# Patient Record
Sex: MALE | Race: WHITE | Employment: OTHER | URBAN - METROPOLITAN AREA
[De-identification: names, ages, dates, MRNs, and addresses within clinical notes are randomized per-mention and may not be internally consistent; named-entity substitution may affect disease eponyms.]

---

## 2017-01-24 ENCOUNTER — HOSPITAL ENCOUNTER (OUTPATIENT)
Dept: WOUND CARE | Age: 77
Discharge: HOME | End: 2017-01-24
Attending: PHYSICAL MEDICINE & REHABILITATION
Payer: MEDICARE

## 2017-01-24 PROCEDURE — 99214 OFFICE O/P EST MOD 30 MIN: CPT

## 2017-03-07 ENCOUNTER — HOSPITAL ENCOUNTER (OUTPATIENT)
Dept: WOUND CARE | Age: 77
Discharge: HOME | End: 2017-03-07
Attending: PHYSICAL MEDICINE & REHABILITATION
Payer: MEDICARE

## 2017-03-07 PROCEDURE — 99213 OFFICE O/P EST LOW 20 MIN: CPT

## 2017-03-07 PROCEDURE — 87088 URINE BACTERIA CULTURE: CPT | Performed by: GENERAL ACUTE CARE HOSPITAL

## 2017-03-07 PROCEDURE — 87186 SC STD MICRODIL/AGAR DIL: CPT | Performed by: GENERAL ACUTE CARE HOSPITAL

## 2017-03-07 PROCEDURE — 87086 URINE CULTURE/COLONY COUNT: CPT | Performed by: GENERAL ACUTE CARE HOSPITAL

## 2017-03-12 LAB
BACTERIA SPEC CULT: ABNORMAL
BACTERIA SPEC CULT: ABNORMAL
SERVICE CMNT-IMP: ABNORMAL

## 2017-04-04 ENCOUNTER — HOSPITAL ENCOUNTER (OUTPATIENT)
Dept: WOUND CARE | Age: 77
Discharge: HOME | End: 2017-04-04
Attending: PHYSICAL MEDICINE & REHABILITATION
Payer: MEDICARE

## 2017-04-04 PROCEDURE — 87075 CULTR BACTERIA EXCEPT BLOOD: CPT | Performed by: SURGERY

## 2017-04-04 PROCEDURE — 87076 CULTURE ANAEROBE IDENT EACH: CPT | Performed by: GENERAL ACUTE CARE HOSPITAL

## 2017-04-04 PROCEDURE — 87186 SC STD MICRODIL/AGAR DIL: CPT | Performed by: SURGERY

## 2017-04-04 PROCEDURE — 77030011256 HC DRSG MEPILEX <16IN NO BORD MOLN -A

## 2017-04-04 PROCEDURE — 87077 CULTURE AEROBIC IDENTIFY: CPT | Performed by: SURGERY

## 2017-04-04 PROCEDURE — 99215 OFFICE O/P EST HI 40 MIN: CPT

## 2017-04-04 PROCEDURE — 87205 SMEAR GRAM STAIN: CPT | Performed by: SURGERY

## 2017-04-10 LAB
BACTERIA SPEC CULT: ABNORMAL
GRAM STN SPEC: ABNORMAL
GRAM STN SPEC: ABNORMAL
SERVICE CMNT-IMP: ABNORMAL

## 2017-04-17 LAB
ANAEROBE ID RESULT 1, RAND1T: ABNORMAL
ANAEROBE ID W/SUSCEPT., ANIDLT: NORMAL
ANTIMICROBIAL SUSCEPT., RAND5T: ABNORMAL
BACTERIA SPEC CULT: ABNORMAL
SERVICE CMNT-IMP: ABNORMAL
SPECIMEN SOURCE: NORMAL

## 2017-04-26 ENCOUNTER — APPOINTMENT (OUTPATIENT)
Dept: CT IMAGING | Age: 77
End: 2017-04-26
Attending: EMERGENCY MEDICINE
Payer: MEDICARE

## 2017-04-26 ENCOUNTER — HOSPITAL ENCOUNTER (EMERGENCY)
Age: 77
Discharge: OTHER INSTITUTION | End: 2017-04-26
Attending: EMERGENCY MEDICINE
Payer: MEDICARE

## 2017-04-26 VITALS
OXYGEN SATURATION: 95 % | HEART RATE: 50 BPM | TEMPERATURE: 97.8 F | SYSTOLIC BLOOD PRESSURE: 108 MMHG | HEIGHT: 68 IN | DIASTOLIC BLOOD PRESSURE: 51 MMHG | RESPIRATION RATE: 16 BRPM | BODY MASS INDEX: 25.91 KG/M2 | WEIGHT: 171 LBS

## 2017-04-26 DIAGNOSIS — R10.9 ACUTE ABDOMINAL PAIN: Primary | ICD-10-CM

## 2017-04-26 DIAGNOSIS — K56.41 FECAL IMPACTION (HCC): ICD-10-CM

## 2017-04-26 DIAGNOSIS — K59.03 DRUG-INDUCED CONSTIPATION: ICD-10-CM

## 2017-04-26 LAB
ALBUMIN SERPL BCP-MCNC: 2.9 G/DL (ref 3.2–4.6)
ALBUMIN/GLOB SERPL: 0.6 {RATIO} (ref 1.2–3.5)
ALP SERPL-CCNC: 61 U/L (ref 50–136)
ALT SERPL-CCNC: 13 U/L (ref 12–65)
ANION GAP BLD CALC-SCNC: 11 MMOL/L (ref 7–16)
APPEARANCE UR: ABNORMAL
AST SERPL W P-5'-P-CCNC: 9 U/L (ref 15–37)
BACTERIA URNS QL MICRO: ABNORMAL /HPF
BASOPHILS # BLD AUTO: 0 K/UL (ref 0–0.2)
BASOPHILS # BLD: 0 % (ref 0–2)
BILIRUB SERPL-MCNC: 0.3 MG/DL (ref 0.2–1.1)
BILIRUB UR QL: NEGATIVE
BUN SERPL-MCNC: 20 MG/DL (ref 8–23)
CALCIUM SERPL-MCNC: 8.4 MG/DL (ref 8.3–10.4)
CASTS URNS QL MICRO: ABNORMAL /LPF
CHLORIDE SERPL-SCNC: 103 MMOL/L (ref 98–107)
CO2 SERPL-SCNC: 24 MMOL/L (ref 21–32)
COLOR UR: YELLOW
CREAT SERPL-MCNC: 0.68 MG/DL (ref 0.8–1.5)
DIFFERENTIAL METHOD BLD: ABNORMAL
EOSINOPHIL # BLD: 0.2 K/UL (ref 0–0.8)
EOSINOPHIL NFR BLD: 3 % (ref 0.5–7.8)
EPI CELLS #/AREA URNS HPF: ABNORMAL /HPF
ERYTHROCYTE [DISTWIDTH] IN BLOOD BY AUTOMATED COUNT: 16.5 % (ref 11.9–14.6)
GLOBULIN SER CALC-MCNC: 4.9 G/DL (ref 2.3–3.5)
GLUCOSE SERPL-MCNC: 91 MG/DL (ref 65–100)
GLUCOSE UR STRIP.AUTO-MCNC: NEGATIVE MG/DL
HCT VFR BLD AUTO: 33.7 % (ref 41.1–50.3)
HGB BLD-MCNC: 10.8 G/DL (ref 13.6–17.2)
HGB UR QL STRIP: ABNORMAL
IMM GRANULOCYTES # BLD: 0 K/UL (ref 0–0.5)
IMM GRANULOCYTES NFR BLD AUTO: 0 % (ref 0–5)
KETONES UR QL STRIP.AUTO: NEGATIVE MG/DL
LACTATE BLD-SCNC: 0.5 MMOL/L (ref 0.5–1.9)
LEUKOCYTE ESTERASE UR QL STRIP.AUTO: ABNORMAL
LIPASE SERPL-CCNC: 71 U/L (ref 73–393)
LYMPHOCYTES # BLD AUTO: 27 % (ref 13–44)
LYMPHOCYTES # BLD: 2.2 K/UL (ref 0.5–4.6)
MCH RBC QN AUTO: 26.2 PG (ref 26.1–32.9)
MCHC RBC AUTO-ENTMCNC: 32 G/DL (ref 31.4–35)
MCV RBC AUTO: 81.8 FL (ref 79.6–97.8)
MONOCYTES # BLD: 0.7 K/UL (ref 0.1–1.3)
MONOCYTES NFR BLD AUTO: 9 % (ref 4–12)
NEUTS SEG # BLD: 5.1 K/UL (ref 1.7–8.2)
NEUTS SEG NFR BLD AUTO: 61 % (ref 43–78)
NITRITE UR QL STRIP.AUTO: POSITIVE
PH UR STRIP: 8 [PH] (ref 5–9)
PLATELET # BLD AUTO: 294 K/UL (ref 150–450)
PLATELET COMMENTS,PCOM: ADEQUATE
PMV BLD AUTO: 8.4 FL (ref 10.8–14.1)
POTASSIUM SERPL-SCNC: 4 MMOL/L (ref 3.5–5.1)
PROT SERPL-MCNC: 7.8 G/DL (ref 6.3–8.2)
PROT UR STRIP-MCNC: 30 MG/DL
RBC # BLD AUTO: 4.12 M/UL (ref 4.23–5.67)
RBC #/AREA URNS HPF: ABNORMAL /HPF
RBC MORPH BLD: ABNORMAL
SODIUM SERPL-SCNC: 138 MMOL/L (ref 136–145)
SP GR UR REFRACTOMETRY: 1.02 (ref 1–1.02)
UROBILINOGEN UR QL STRIP.AUTO: 0.2 EU/DL (ref 0.2–1)
WBC # BLD AUTO: 8.2 K/UL (ref 4.3–11.1)
WBC MORPH BLD: ABNORMAL
WBC URNS QL MICRO: >100 /HPF

## 2017-04-26 PROCEDURE — 99285 EMERGENCY DEPT VISIT HI MDM: CPT | Performed by: EMERGENCY MEDICINE

## 2017-04-26 PROCEDURE — 83605 ASSAY OF LACTIC ACID: CPT

## 2017-04-26 PROCEDURE — 83690 ASSAY OF LIPASE: CPT | Performed by: EMERGENCY MEDICINE

## 2017-04-26 PROCEDURE — 74011636320 HC RX REV CODE- 636/320: Performed by: EMERGENCY MEDICINE

## 2017-04-26 PROCEDURE — 96374 THER/PROPH/DIAG INJ IV PUSH: CPT | Performed by: EMERGENCY MEDICINE

## 2017-04-26 PROCEDURE — 85025 COMPLETE CBC W/AUTO DIFF WBC: CPT | Performed by: EMERGENCY MEDICINE

## 2017-04-26 PROCEDURE — 81001 URINALYSIS AUTO W/SCOPE: CPT | Performed by: EMERGENCY MEDICINE

## 2017-04-26 PROCEDURE — 74177 CT ABD & PELVIS W/CONTRAST: CPT

## 2017-04-26 PROCEDURE — 74011000258 HC RX REV CODE- 258: Performed by: EMERGENCY MEDICINE

## 2017-04-26 PROCEDURE — 96376 TX/PRO/DX INJ SAME DRUG ADON: CPT | Performed by: EMERGENCY MEDICINE

## 2017-04-26 PROCEDURE — 80053 COMPREHEN METABOLIC PANEL: CPT | Performed by: EMERGENCY MEDICINE

## 2017-04-26 PROCEDURE — 74011250636 HC RX REV CODE- 250/636: Performed by: EMERGENCY MEDICINE

## 2017-04-26 RX ORDER — LACTULOSE 10 G/15ML
20 SOLUTION ORAL; RECTAL 2 TIMES DAILY
Qty: 480 ML | Refills: 0 | Status: SHIPPED | OUTPATIENT
Start: 2017-04-26 | End: 2017-05-04

## 2017-04-26 RX ORDER — HYDROMORPHONE HYDROCHLORIDE 1 MG/ML
1 INJECTION, SOLUTION INTRAMUSCULAR; INTRAVENOUS; SUBCUTANEOUS
Status: COMPLETED | OUTPATIENT
Start: 2017-04-26 | End: 2017-04-26

## 2017-04-26 RX ORDER — SODIUM CHLORIDE 0.9 % (FLUSH) 0.9 %
10 SYRINGE (ML) INJECTION
Status: COMPLETED | OUTPATIENT
Start: 2017-04-26 | End: 2017-04-26

## 2017-04-26 RX ADMIN — HYDROMORPHONE HYDROCHLORIDE 1 MG: 1 INJECTION, SOLUTION INTRAMUSCULAR; INTRAVENOUS; SUBCUTANEOUS at 13:01

## 2017-04-26 RX ADMIN — DIATRIZOATE MEGLUMINE AND DIATRIZOATE SODIUM 30 ML: 600; 100 SOLUTION ORAL; RECTAL at 13:01

## 2017-04-26 RX ADMIN — Medication 10 ML: at 16:05

## 2017-04-26 RX ADMIN — HYDROMORPHONE HYDROCHLORIDE 1 MG: 1 INJECTION, SOLUTION INTRAMUSCULAR; INTRAVENOUS; SUBCUTANEOUS at 14:53

## 2017-04-26 RX ADMIN — SODIUM CHLORIDE 100 ML: 900 INJECTION, SOLUTION INTRAVENOUS at 16:05

## 2017-04-26 RX ADMIN — IOVERSOL 100 ML: 741 INJECTION INTRA-ARTERIAL; INTRAVENOUS at 16:05

## 2017-04-26 NOTE — ED NOTES
Dr. Umm Wiggins notified for pts lower blood pressures. Dr. Umm Wiggins stated he was okay with sending pt back to [facility].

## 2017-04-26 NOTE — ED PROVIDER NOTES
HPI Comments: Presents with complaint of abdominal distention and pain and no bowel movement for 10 days. He lives in a nursing home and they didn't x-ray that was concerning for an ileus so sent him here. He denies any nausea or vomiting. He takes  Chronic pain medicine. He reports he has been given milk of magnesia and a suppository recently. Patient is a 78 y.o. male presenting with abdominal pain. Abdominal Pain    This is a new problem. The current episode started more than 1 week ago. The problem occurs constantly. The problem has been gradually worsening. The pain is located in the generalized abdominal region. The pain is moderate. Associated symptoms include constipation. Pertinent negatives include no fever, no diarrhea, no nausea, no vomiting, no dysuria and no frequency. Nothing worsens the pain. The pain is relieved by nothing. The patient's surgical history non-contributory.        Past Medical History:   Diagnosis Date    Acute pain due to trauma     Anxiety     Bipolar 1 disorder (HCC)     unspecified    Brief psychotic disorder     Chronic UTI      Complicated UTI (urinary tract infection)      Decubitus ulcer     Encephalopathy      GERD (gastroesophageal reflux disease)     Hereditary and idiopathic neuropathy     History of femur fracture     History of kidney stones     History of rectal sphincterotomy     Hx of urinary frequency     Hyperlipidemia     Hyperlipidemia     Hypertensive retinopathy     Hyponatremia     Kidney stone     Major depressive disorder     Muscle weakness     Neurogenic bladder, NOS     Obstructive and reflux uropathy     Paranoid schizophrenia ([address]     Psychiatric disorder     PSDS with psychosis    Quadriplegia ([address] at age 47- per nursing home records    C5-C7 per office note    Scrotal abscess     history of     Sepsis      Suprapubic catheter ([address]     Thyroid disease        Past Surgical History:   Procedure Laterality Date    HX OTHER SURGICAL      Decubitus debridement    HX UROLOGICAL      Kidney Stone Extraction with Stent    VT REMOVAL WITH INSERTION OF SUPRAPUBIC CATHETER           Family History:   Problem Relation Age of Onset    Family history unknown: Yes       Social History     Social History    Marital status:      Spouse name: N/A    Number of children: N/A    Years of education: N/A     Occupational History    Not on file. Social History Main Topics    Smoking status: Never Smoker    Smokeless tobacco: Never Used    Alcohol use No    Drug use: No    Sexual activity: Not on file     Other Topics Concern    Not on file     Social History Narrative         ALLERGIES: Bactrim [sulfamethoprim ds]; Benadryl [diphenhydramine hcl]; Ceftin [cefuroxime axetil]; Cefuroxime; Pamalee Julian; Levaquin [levofloxacin]; Phenergan [promethazine]; Pyridium [phenazopyridine]; and Garnette Albert    Review of Systems   Constitutional: Negative for chills and fever. Gastrointestinal: Positive for abdominal pain and constipation. Negative for diarrhea, nausea and vomiting. Genitourinary: Negative for dysuria and frequency. All other systems reviewed and are negative. Vitals:    04/26/17 1229 04/26/17 1231 04/26/17 1249   BP: (!) 134/102  155/73   Pulse: 62  66   Resp: 18  16   Temp: 97.9 °F (36.6 °C)     SpO2:   96%   Weight:  77.6 kg (171 lb)    Height: 5' 8\" (1.727 m)              Physical Exam   Constitutional: He is oriented to person, place, and time. He appears well-developed and well-nourished. No distress. HENT:   Head: Normocephalic and atraumatic. Neck: Normal range of motion. Neck supple. Cardiovascular: Normal rate and regular rhythm. Pulmonary/Chest: Effort normal and breath sounds normal. No respiratory distress. He has no wheezes. He has no rales. Abdominal: Soft. He exhibits distension. There is tenderness. There is guarding. There is no rebound.    High-pitched decreased Musculoskeletal: Normal range of motion. He exhibits no edema. Neurological: He is alert and oriented to person, place, and time. No cranial nerve deficit. Skin: Skin is warm and dry. No rash noted. He is not diaphoretic. No erythema. Psychiatric: He has a normal mood and affect. His behavior is normal.   Nursing note and vitals reviewed. MDM  Number of Diagnoses or Management Options  Diagnosis management comments: Pt checked out to Dr. Juli Gan for f/u of CT        Amount and/or Complexity of Data Reviewed  Clinical lab tests: ordered and reviewed  Review and summarize past medical records: yes    Risk of Complications, Morbidity, and/or Mortality  Presenting problems: high  Diagnostic procedures: moderate  Management options: moderate    Patient Progress  Patient progress: stable    ED Course       Procedures    Results Include:    Recent Results (from the past 24 hour(s))   CBC WITH AUTOMATED DIFF    Collection Time: 04/26/17 12:45 PM   Result Value Ref Range    WBC 8.2 4.3 - 11.1 K/uL    RBC 4.12 (L) 4.23 - 5.67 M/uL    HGB 10.8 (L) 13.6 - 17.2 g/dL    HCT 33.7 (L) 41.1 - 50.3 %    MCV 81.8 79.6 - 97.8 FL    MCH 26.2 26.1 - 32.9 PG    MCHC 32.0 31.4 - 35.0 g/dL    RDW 16.5 (H) 11.9 - 14.6 %    PLATELET 752 282 - 481 K/uL    MPV 8.4 (L) 10.8 - 14.1 FL    NEUTROPHILS 61 43 - 78 %    LYMPHOCYTES 27 13 - 44 %    MONOCYTES 9 4.0 - 12.0 %    EOSINOPHILS 3 0.5 - 7.8 %    BASOPHILS 0 0.0 - 2.0 %    IMMATURE GRANULOCYTES 0 0.0 - 5.0 %    ABS. NEUTROPHILS 5.1 1.7 - 8.2 K/UL    ABS. LYMPHOCYTES 2.2 0.5 - 4.6 K/UL    ABS. MONOCYTES 0.7 0.1 - 1.3 K/UL    ABS. EOSINOPHILS 0.2 0.0 - 0.8 K/UL    ABS. BASOPHILS 0.0 0.0 - 0.2 K/UL    ABS. IMM.  GRANS. 0.0 0.0 - 0.5 K/UL    RBC COMMENTS SLIGHT  ANISOCYTOSIS + POIKILOCYTOSIS        WBC COMMENTS Result Confirmed By Smear      PLATELET COMMENTS ADEQUATE      DF AUTOMATED     METABOLIC PANEL, COMPREHENSIVE    Collection Time: 04/26/17 12:45 PM   Result Value Ref Range Sodium 138 136 - 145 mmol/L    Potassium 4.0 3.5 - 5.1 mmol/L    Chloride 103 98 - 107 mmol/L    CO2 24 21 - 32 mmol/L    Anion gap 11 7 - 16 mmol/L    Glucose 91 65 - 100 mg/dL    BUN 20 8 - 23 MG/DL    Creatinine 0.68 (L) 0.8 - 1.5 MG/DL    GFR est AA >60 >60 ml/min/1.73m2    GFR est non-AA >60 >60 ml/min/1.73m2    Calcium 8.4 8.3 - 10.4 MG/DL    Bilirubin, total 0.3 0.2 - 1.1 MG/DL    ALT (SGPT) 13 12 - 65 U/L    AST (SGOT) 9 (L) 15 - 37 U/L    Alk. phosphatase 61 50 - 136 U/L    Protein, total 7.8 6.3 - 8.2 g/dL    Albumin 2.9 (L) 3.2 - 4.6 g/dL    Globulin 4.9 (H) 2.3 - 3.5 g/dL    A-G Ratio 0.6 (L) 1.2 - 3.5     LIPASE    Collection Time: 04/26/17 12:45 PM   Result Value Ref Range    Lipase 71 (L) 73 - 393 U/L   POC LACTIC ACID    Collection Time: 04/26/17  1:09 PM   Result Value Ref Range    Lactic Acid (POC) 0.5 0.5 - 1.9 mmol/L   URINALYSIS W/ RFLX MICROSCOPIC    Collection Time: 04/26/17  1:36 PM   Result Value Ref Range    Color YELLOW      Appearance TURBID      Specific gravity 1.017 1.001 - 1.023      pH (UA) 8.0 5.0 - 9.0      Protein 30 (A) NEG mg/dL    Glucose NEGATIVE  mg/dL    Ketone NEGATIVE  NEG mg/dL    Bilirubin NEGATIVE  NEG      Blood MODERATE (A) NEG      Urobilinogen 0.2 0.2 - 1.0 EU/dL    Nitrites POSITIVE (A) NEG      Leukocyte Esterase LARGE (A) NEG      WBC >100 (H) 0 /hpf    RBC 20-50 0 /hpf    Epithelial cells 0-3 0 /hpf    Bacteria 3+ (H) 0 /hpf    Casts 10-20 0 /lpf     Ct Abd Pelv W Cont    Result Date: 4/26/2017  CT ABDOMEN AND PELVIS WITH INTRAVENOUS CONTRAST DATED 4/26/2017. History: Abdominal distention and no bowel movement in 10 days. Comparison: CT abdomen and pelvis without contrast 11/12/2015 Technique:   Multiple contiguous helical CT images reconstructed at 5 mm intervals were obtained from above the diaphragms through the ischial tuberosities following oral and 100 cc Isovue-370 without acute complication.  All CT scans performed at this facility use one or all of the following: Automated exposure control, adjustment of the mA and/or kVp according to patient's size, iterative reconstruction. Findings: CT ABDOMEN:  Limited evaluation of the lung bases and base of the mediastinum demonstrates moderate basilar atelectasis. Left gynecomastia is seen. The Liver is homogeneous in attenuation. The spleen is homogeneous in attenuation. No contour deforming or enhancing mass lesions are seen of the pancreas or adrenal glands. The gallbladder has an unremarkable CT appearance without radiopaque stones or pericholecystic fluid/inflammatory changes. Moderate hydronephrosis is seen of the right kidney which is likely chronic given that there is a ureteral stent, given that there is significant cortical atrophy of the right kidney. Multiple nonobstructing right renal stones are seen. No significant small bowel dilation is seen. The colon appears diffusely distended with stool and fluid. Moderate to dense stool is seen in the descending colon and sigmoid colon. There is significant redundancy of the sigmoid colon which is also moderately dilated measuring up to 8.7 cm in diameter. There is wall thickening of the mid and distal rectum and very dense stool in the rectum extending down to the anus. It is uncertain whether this represents a stool impaction of the colon, or whether there may be a very distal obstructing abnormality at the level of the anus which is not clearly evident by CT imaging. No free fluid, free air, or focal inflammatory changes are seen in the abdomen. No adenopathy is seen. The abdominal aorta is unremarkable in appearance. CT PELVIS: No abnormal pelvic fluid collections or inflammatory changes are present. Prominent right pelvic lymph nodes are seen with similar lymph node seen on the prior exam.  The urinary bladder is collapsed about a suprapubic catheter and therefore not well assessed.  A worsening soft tissue defect possibly representing changes of a sacral decubitus ulcer are seen posterior to the right hip extending towards the perineum. Exposed bone is seen at the level of the issue tuberosity. Chronic deformities are seen of the bilateral hips with similar changes seen on the prior study. IMPRESSION:  1. Moderate to dense stool in the descending colon and sigmoid colon. There is significant redundancy of the sigmoid colon which is also moderately dilated measuring up to 8.7 cm in diameter. There is wall thickening of the mid and distal rectum and very dense stool in the rectum extending down to the anus. It is uncertain whether this represents a stool impaction of the colon, or whether there may be a very distal obstructing abnormality at the level of the anus which is not clearly evident by CT imaging. 2.  Enlarging soft tissue defect extending from posterior to the right hip towards the perineum possibly representing sequela of a sacral decubitus ulcer. Exposed bone is seen at the level of the issue tuberosity. Rectal examination is done. There is no anal obstruction. The patient is impacted and a large amount of stool was disimpacted. .  Patient's abdomen is soft. He does not tolerate magnesium citrate nor  MiraLAX.   Will have a trial of lactulose and perhaps Movantik

## 2017-04-26 NOTE — PROGRESS NOTES
's visit requested in ED. Mr. Adama Coello requested my assistance to contact his family. He was able to leave a phone message for his sister.      Micki Pizano 70  Board Certified

## 2017-04-26 NOTE — ED NOTES
I have reviewed discharge instructions with the patient. The patient verbalized understanding. Prescriptions given to patient. Jah jones called.

## 2017-04-28 ENCOUNTER — APPOINTMENT (OUTPATIENT)
Dept: GENERAL RADIOLOGY | Age: 77
End: 2017-04-28
Attending: EMERGENCY MEDICINE
Payer: MEDICARE

## 2017-04-28 ENCOUNTER — HOSPITAL ENCOUNTER (EMERGENCY)
Age: 77
Discharge: HOME | End: 2017-04-28
Attending: EMERGENCY MEDICINE
Payer: MEDICARE

## 2017-04-28 VITALS
BODY MASS INDEX: 25.91 KG/M2 | OXYGEN SATURATION: 98 % | SYSTOLIC BLOOD PRESSURE: 156 MMHG | HEIGHT: 68 IN | TEMPERATURE: 98 F | RESPIRATION RATE: 16 BRPM | DIASTOLIC BLOOD PRESSURE: 69 MMHG | HEART RATE: 56 BPM | WEIGHT: 171 LBS

## 2017-04-28 DIAGNOSIS — K59.09 OTHER CONSTIPATION: Primary | ICD-10-CM

## 2017-04-28 DIAGNOSIS — K56.41 FECAL IMPACTION (HCC): ICD-10-CM

## 2017-04-28 DIAGNOSIS — G82.50 QUADRIPLEGIA (HCC): ICD-10-CM

## 2017-04-28 DIAGNOSIS — L89.319 DECUBITUS ULCER OF RIGHT BUTTOCK, UNSPECIFIED ULCER STAGE: ICD-10-CM

## 2017-04-28 LAB
ALBUMIN SERPL BCP-MCNC: 2.8 G/DL (ref 3.2–4.6)
ALBUMIN/GLOB SERPL: 0.6 {RATIO} (ref 1.2–3.5)
ALP SERPL-CCNC: 67 U/L (ref 50–136)
ALT SERPL-CCNC: 11 U/L (ref 12–65)
ANION GAP BLD CALC-SCNC: 11 MMOL/L (ref 7–16)
APPEARANCE UR: ABNORMAL
AST SERPL W P-5'-P-CCNC: 17 U/L (ref 15–37)
BACTERIA URNS QL MICRO: ABNORMAL /HPF
BASOPHILS # BLD AUTO: 0 K/UL (ref 0–0.2)
BASOPHILS # BLD: 0 % (ref 0–2)
BILIRUB SERPL-MCNC: 0.4 MG/DL (ref 0.2–1.1)
BILIRUB UR QL: NEGATIVE
BUN SERPL-MCNC: 16 MG/DL (ref 8–23)
CALCIUM SERPL-MCNC: 8.1 MG/DL (ref 8.3–10.4)
CASTS URNS QL MICRO: 0 /LPF
CHLORIDE SERPL-SCNC: 103 MMOL/L (ref 98–107)
CO2 SERPL-SCNC: 23 MMOL/L (ref 21–32)
COLOR UR: YELLOW
CREAT SERPL-MCNC: 0.68 MG/DL (ref 0.8–1.5)
CRYSTALS URNS QL MICRO: ABNORMAL /LPF
DIFFERENTIAL METHOD BLD: ABNORMAL
EOSINOPHIL # BLD: 0.1 K/UL (ref 0–0.8)
EOSINOPHIL NFR BLD: 2 % (ref 0.5–7.8)
EPI CELLS #/AREA URNS HPF: ABNORMAL /HPF
ERYTHROCYTE [DISTWIDTH] IN BLOOD BY AUTOMATED COUNT: 16.4 % (ref 11.9–14.6)
GLOBULIN SER CALC-MCNC: 5 G/DL (ref 2.3–3.5)
GLUCOSE SERPL-MCNC: 115 MG/DL (ref 65–100)
GLUCOSE UR STRIP.AUTO-MCNC: NEGATIVE MG/DL
HCT VFR BLD AUTO: 33.1 % (ref 41.1–50.3)
HGB BLD-MCNC: 10.7 G/DL (ref 13.6–17.2)
HGB UR QL STRIP: ABNORMAL
IMM GRANULOCYTES # BLD: 0 K/UL (ref 0–0.5)
IMM GRANULOCYTES NFR BLD AUTO: 0.4 % (ref 0–5)
KETONES UR QL STRIP.AUTO: NEGATIVE MG/DL
LACTATE BLD-SCNC: 0.7 MMOL/L (ref 0.5–1.9)
LEUKOCYTE ESTERASE UR QL STRIP.AUTO: ABNORMAL
LYMPHOCYTES # BLD AUTO: 26 % (ref 13–44)
LYMPHOCYTES # BLD: 1.9 K/UL (ref 0.5–4.6)
MCH RBC QN AUTO: 26.2 PG (ref 26.1–32.9)
MCHC RBC AUTO-ENTMCNC: 32.3 G/DL (ref 31.4–35)
MCV RBC AUTO: 80.9 FL (ref 79.6–97.8)
MONOCYTES # BLD: 0.6 K/UL (ref 0.1–1.3)
MONOCYTES NFR BLD AUTO: 8 % (ref 4–12)
MUCOUS THREADS URNS QL MICRO: 0 /LPF
NEUTS SEG # BLD: 4.7 K/UL (ref 1.7–8.2)
NEUTS SEG NFR BLD AUTO: 64 % (ref 43–78)
NITRITE UR QL STRIP.AUTO: POSITIVE
PH UR STRIP: 8.5 [PH] (ref 5–9)
PLATELET # BLD AUTO: 287 K/UL (ref 150–450)
PMV BLD AUTO: 8.7 FL (ref 10.8–14.1)
POTASSIUM SERPL-SCNC: 4.4 MMOL/L (ref 3.5–5.1)
PROT SERPL-MCNC: 7.8 G/DL (ref 6.3–8.2)
PROT UR STRIP-MCNC: 30 MG/DL
RBC # BLD AUTO: 4.09 M/UL (ref 4.23–5.67)
RBC #/AREA URNS HPF: ABNORMAL /HPF
SODIUM SERPL-SCNC: 137 MMOL/L (ref 136–145)
SP GR UR REFRACTOMETRY: 1.01
UROBILINOGEN UR QL STRIP.AUTO: 0.2 EU/DL (ref 0.2–1)
WBC # BLD AUTO: 7.3 K/UL (ref 4.3–11.1)
WBC URNS QL MICRO: ABNORMAL /HPF

## 2017-04-28 PROCEDURE — 99284 EMERGENCY DEPT VISIT MOD MDM: CPT | Performed by: EMERGENCY MEDICINE

## 2017-04-28 PROCEDURE — 96374 THER/PROPH/DIAG INJ IV PUSH: CPT | Performed by: EMERGENCY MEDICINE

## 2017-04-28 PROCEDURE — 83605 ASSAY OF LACTIC ACID: CPT

## 2017-04-28 PROCEDURE — 80053 COMPREHEN METABOLIC PANEL: CPT | Performed by: EMERGENCY MEDICINE

## 2017-04-28 PROCEDURE — 81015 MICROSCOPIC EXAM OF URINE: CPT | Performed by: EMERGENCY MEDICINE

## 2017-04-28 PROCEDURE — 74011250636 HC RX REV CODE- 250/636: Performed by: EMERGENCY MEDICINE

## 2017-04-28 PROCEDURE — 85025 COMPLETE CBC W/AUTO DIFF WBC: CPT | Performed by: EMERGENCY MEDICINE

## 2017-04-28 PROCEDURE — 74022 RADEX COMPL AQT ABD SERIES: CPT

## 2017-04-28 PROCEDURE — 81003 URINALYSIS AUTO W/O SCOPE: CPT | Performed by: EMERGENCY MEDICINE

## 2017-04-28 PROCEDURE — 96375 TX/PRO/DX INJ NEW DRUG ADDON: CPT | Performed by: EMERGENCY MEDICINE

## 2017-04-28 RX ORDER — ONDANSETRON 2 MG/ML
4 INJECTION INTRAMUSCULAR; INTRAVENOUS
Status: COMPLETED | OUTPATIENT
Start: 2017-04-28 | End: 2017-04-28

## 2017-04-28 RX ORDER — MORPHINE SULFATE 4 MG/ML
4 INJECTION, SOLUTION INTRAMUSCULAR; INTRAVENOUS
Status: COMPLETED | OUTPATIENT
Start: 2017-04-28 | End: 2017-04-28

## 2017-04-28 RX ADMIN — ONDANSETRON 4 MG: 2 INJECTION INTRAMUSCULAR; INTRAVENOUS at 16:57

## 2017-04-28 RX ADMIN — MORPHINE SULFATE 4 MG: 4 INJECTION, SOLUTION INTRAMUSCULAR; INTRAVENOUS at 16:57

## 2017-04-28 NOTE — ED NOTES
Attempted to call pts [facility] at # listed on pts paperwork [phone] multiple times. Goes to  and then to a message line. No one will answer. Bere Ortez being called to transport pt back. Will attempt to call again prior to pt leaving.

## 2017-04-28 NOTE — ED TRIAGE NOTES
Pt arrives per EMS for complaints of abdominal pain/cramping. Seen recently for same was had to be disimpacted. Pt states he thinks he is still impacted. Pt has had two bowel movement since going home.

## 2017-04-28 NOTE — ED NOTES
at bedside to remove very large amounts of stool with impaction. Pt tolerated well. Pt reports feeling much better after. Pts abdomen now soft. PT is requesting pain medications at this time.  aware and Morphine ordered.

## 2017-04-28 NOTE — ED NOTES
Pt cleaned up after stool removal. No dressing present in pts sacral wound. Wet to dry dressing placed. New brief placed and pts clothing put back on.

## 2017-04-28 NOTE — ED PROVIDER NOTES
Patient is a 78 y.o. male presenting with abdominal pain. The history is provided by the patient and medical records. Abdominal Pain    This is a recurrent problem. The problem occurs daily. The problem has not changed since onset. Associated with: constipation. The pain is located in the generalized abdominal region. The quality of the pain is colicky. The pain is moderate. Associated symptoms include constipation. Pertinent negatives include no fever, no diarrhea, no nausea and no vomiting. Nothing worsens the pain. Relieved by: digital disimpaction in er a few days ago. Past medical history comments: c-5/6 quadriplegia. The patient's surgical history non-contributory.        Past Medical History:   Diagnosis Date    Acute pain due to trauma     Anxiety     Bipolar 1 disorder (HCC)     unspecified    Brief psychotic disorder     Chronic UTI      Complicated UTI (urinary tract infection)      Decubitus ulcer     Encephalopathy      GERD (gastroesophageal reflux disease)     Hereditary and idiopathic neuropathy     History of femur fracture     History of kidney stones     History of rectal sphincterotomy     Hx of urinary frequency     Hyperlipidemia     Hyperlipidemia     Hypertensive retinopathy     Hyponatremia     Kidney stone     Major depressive disorder     Muscle weakness     Neurogenic bladder, NOS     Obstructive and reflux uropathy     Paranoid schizophrenia ([address]     Psychiatric disorder     PSDS with psychosis    Quadriplegia ([address] at age 47- per nursing home records    C5-C7 per office note    Scrotal abscess     history of     Sepsis      Suprapubic catheter ([address]     Thyroid disease        Past Surgical History:   Procedure Laterality Date    HX OTHER SURGICAL      Decubitus debridement    HX UROLOGICAL      Kidney Stone Extraction with Stent    PA REMOVAL WITH INSERTION OF SUPRAPUBIC CATHETER           Family History:   Problem Relation Age of Onset    Family history unknown: Yes       Social History     Social History    Marital status:      Spouse name: N/A    Number of children: N/A    Years of education: N/A     Occupational History    Not on file. Social History Main Topics    Smoking status: Never Smoker    Smokeless tobacco: Never Used    Alcohol use No    Drug use: No    Sexual activity: Not on file     Other Topics Concern    Not on file     Social History Narrative         ALLERGIES: Bactrim [sulfamethoprim ds]; Benadryl [diphenhydramine hcl]; Ceftin [cefuroxime axetil]; Cefuroxime; Signa Hakim; Levaquin [levofloxacin]; Phenergan [promethazine]; Pyridium [phenazopyridine]; and Justin Pier    Review of Systems   Constitutional: Negative for chills and fever. Gastrointestinal: Positive for abdominal pain and constipation. Negative for diarrhea, nausea and vomiting. Genitourinary:        Chronic suprapubic cox cath     Skin: Positive for wound. Vitals:    04/28/17 1523 04/28/17 1702   BP: 146/63 156/69   Pulse: (!) 56    Resp: 16    Temp: 98 °F (36.7 °C)    SpO2: 97% 98%   Weight: 77.6 kg (171 lb)    Height: 5' 8\" (1.727 m)             Physical Exam   Constitutional: He is oriented to person, place, and time. He appears well-developed and well-nourished. HENT:   Head: Normocephalic and atraumatic. Eyes: Conjunctivae are normal. Pupils are equal, round, and reactive to light. Right eye exhibits no discharge. Left eye exhibits no discharge. No scleral icterus. Neck: Normal range of motion. Neck supple. Cardiovascular: Normal rate, regular rhythm and normal heart sounds. Exam reveals no gallop. No murmur heard. Pulmonary/Chest: Effort normal and breath sounds normal. No respiratory distress. He has no wheezes. He has no rales. Abdominal: Soft. Bowel sounds are normal. He exhibits distension. There is no tenderness. There is no rebound and no guarding. Musculoskeletal: Normal range of motion.  He exhibits no edema. Neurological: He is alert and oriented to person, place, and time. He exhibits abnormal muscle tone. cni 2-12 grossly  c-5/6 quadriplegia   Skin: Skin is warm and dry. He is not diaphoretic. Psychiatric: He has a normal mood and affect. His behavior is normal.   Nursing note and vitals reviewed. MDM  Number of Diagnoses or Management Options  Decubitus ulcer of right buttock, unspecified ulcer stage:   Fecal impaction ([facility] [address]): Other constipation:   Quadriplegia [facility]):   Diagnosis management comments: Medical decision making note:  Fecal impaction,   xrays ok  Three large fist-sized equivalents of stool evacuated digitally, passing gas in-between. Stool is pretty soft, and should be amenable to a miralax washout  This concludes the \"medical decision making note\" part of this emergency department visit note.       ED Course       Procedures

## 2017-04-28 NOTE — DISCHARGE INSTRUCTIONS
Continue with dressing changes to sacral wound and scrotal wound per protocol    He has had the equivalent of 3 large fists or throat stool evacuated from the rectal vault  He is passing gas  Is no obstruction    The MiraLAX washout, he should be able to pass the rest on his own.   Mix 1 full bottle of MiraLAX laxative, into 64 ounces of the drink of your choice (tea, water, or Crystal light)  Drink 8 ounces every 15-20 minutes over the course of 2-3 hours    Return to the ER if worse in any way

## 2017-05-12 ENCOUNTER — HOSPITAL ENCOUNTER (OUTPATIENT)
Dept: WOUND CARE | Age: 77
Discharge: HOME | End: 2017-05-12
Attending: PHYSICAL MEDICINE & REHABILITATION
Payer: MEDICARE

## 2017-05-12 PROCEDURE — 99214 OFFICE O/P EST MOD 30 MIN: CPT

## 2017-06-09 ENCOUNTER — HOSPITAL ENCOUNTER (OUTPATIENT)
Dept: WOUND CARE | Age: 77
Discharge: HOME | End: 2017-06-09
Attending: PHYSICAL MEDICINE & REHABILITATION
Payer: MEDICARE

## 2017-06-09 PROCEDURE — 99214 OFFICE O/P EST MOD 30 MIN: CPT

## 2017-06-09 PROCEDURE — 77030011256 HC DRSG MEPILEX <16IN NO BORD MOLN -A

## 2017-07-21 ENCOUNTER — HOSPITAL ENCOUNTER (OUTPATIENT)
Dept: WOUND CARE | Age: 77
Discharge: HOME | End: 2017-07-21
Attending: PHYSICAL MEDICINE & REHABILITATION
Payer: MEDICARE

## 2017-07-21 PROCEDURE — 99213 OFFICE O/P EST LOW 20 MIN: CPT

## 2017-08-12 ENCOUNTER — HOSPITAL ENCOUNTER (EMERGENCY)
Age: 77
Discharge: HOME | End: 2017-08-12
Attending: EMERGENCY MEDICINE
Payer: MEDICARE

## 2017-08-12 VITALS
BODY MASS INDEX: 25.91 KG/M2 | TEMPERATURE: 98.1 F | RESPIRATION RATE: 18 BRPM | DIASTOLIC BLOOD PRESSURE: 63 MMHG | OXYGEN SATURATION: 94 % | WEIGHT: 171 LBS | HEIGHT: 68 IN | SYSTOLIC BLOOD PRESSURE: 117 MMHG | HEART RATE: 52 BPM

## 2017-08-12 DIAGNOSIS — N39.0 URINARY TRACT INFECTION ASSOCIATED WITH CYSTOSTOMY CATHETER, INITIAL ENCOUNTER (HCC): ICD-10-CM

## 2017-08-12 DIAGNOSIS — T83.510A URINARY TRACT INFECTION ASSOCIATED WITH CYSTOSTOMY CATHETER, INITIAL ENCOUNTER (HCC): ICD-10-CM

## 2017-08-12 DIAGNOSIS — R31.9 HEMATURIA: Primary | ICD-10-CM

## 2017-08-12 LAB
ALBUMIN SERPL BCP-MCNC: 2.5 G/DL (ref 3.2–4.6)
ALBUMIN/GLOB SERPL: 0.5 {RATIO} (ref 1.2–3.5)
ALP SERPL-CCNC: 66 U/L (ref 50–136)
ALT SERPL-CCNC: 9 U/L (ref 12–65)
ANION GAP BLD CALC-SCNC: 10 MMOL/L (ref 7–16)
APPEARANCE UR: ABNORMAL
AST SERPL W P-5'-P-CCNC: 15 U/L (ref 15–37)
BACTERIA URNS QL MICRO: ABNORMAL /HPF
BASOPHILS # BLD AUTO: 0 K/UL (ref 0–0.2)
BASOPHILS # BLD: 0 % (ref 0–2)
BILIRUB SERPL-MCNC: 0.2 MG/DL (ref 0.2–1.1)
BILIRUB UR QL: ABNORMAL
BUN SERPL-MCNC: 28 MG/DL (ref 8–23)
CALCIUM SERPL-MCNC: 7.9 MG/DL (ref 8.3–10.4)
CHLORIDE SERPL-SCNC: 100 MMOL/L (ref 98–107)
CO2 SERPL-SCNC: 25 MMOL/L (ref 21–32)
COLOR UR: ABNORMAL
CREAT SERPL-MCNC: 0.68 MG/DL (ref 0.8–1.5)
DIFFERENTIAL METHOD BLD: ABNORMAL
EOSINOPHIL # BLD: 0.3 K/UL (ref 0–0.8)
EOSINOPHIL NFR BLD: 4 % (ref 0.5–7.8)
EPI CELLS #/AREA URNS HPF: ABNORMAL /HPF
ERYTHROCYTE [DISTWIDTH] IN BLOOD BY AUTOMATED COUNT: 15.8 % (ref 11.9–14.6)
GLOBULIN SER CALC-MCNC: 4.7 G/DL (ref 2.3–3.5)
GLUCOSE SERPL-MCNC: 109 MG/DL (ref 65–100)
GLUCOSE UR STRIP.AUTO-MCNC: NEGATIVE MG/DL
HCT VFR BLD AUTO: 28.5 % (ref 41.1–50.3)
HGB BLD-MCNC: 9.4 G/DL (ref 13.6–17.2)
HGB UR QL STRIP: ABNORMAL
IMM GRANULOCYTES # BLD: 0 K/UL (ref 0–0.5)
IMM GRANULOCYTES NFR BLD AUTO: 0 % (ref 0–5)
KETONES UR QL STRIP.AUTO: ABNORMAL MG/DL
LEUKOCYTE ESTERASE UR QL STRIP.AUTO: ABNORMAL
LYMPHOCYTES # BLD AUTO: 24 % (ref 13–44)
LYMPHOCYTES # BLD: 1.6 K/UL (ref 0.5–4.6)
MCH RBC QN AUTO: 26.5 PG (ref 26.1–32.9)
MCHC RBC AUTO-ENTMCNC: 33 G/DL (ref 31.4–35)
MCV RBC AUTO: 80.3 FL (ref 79.6–97.8)
MONOCYTES # BLD: 0.7 K/UL (ref 0.1–1.3)
MONOCYTES NFR BLD AUTO: 11 % (ref 4–12)
NEUTS SEG # BLD: 3.9 K/UL (ref 1.7–8.2)
NEUTS SEG NFR BLD AUTO: 61 % (ref 43–78)
NITRITE UR QL STRIP.AUTO: POSITIVE
OTHER OBSERVATIONS,UCOM: ABNORMAL
PH UR STRIP: 6 [PH] (ref 5–9)
PLATELET # BLD AUTO: 238 K/UL (ref 150–450)
PLATELET COMMENTS,PCOM: ADEQUATE
PMV BLD AUTO: 8.7 FL (ref 10.8–14.1)
POTASSIUM SERPL-SCNC: 3.9 MMOL/L (ref 3.5–5.1)
PROT SERPL-MCNC: 7.2 G/DL (ref 6.3–8.2)
PROT UR STRIP-MCNC: 100 MG/DL
RBC # BLD AUTO: 3.55 M/UL (ref 4.23–5.67)
RBC #/AREA URNS HPF: >100 /HPF
RBC MORPH BLD: ABNORMAL
SODIUM SERPL-SCNC: 135 MMOL/L (ref 136–145)
SP GR UR REFRACTOMETRY: 1.02 (ref 1–1.02)
UROBILINOGEN UR QL STRIP.AUTO: 1 EU/DL (ref 0.2–1)
WBC # BLD AUTO: 6.5 K/UL (ref 4.3–11.1)
WBC MORPH BLD: ABNORMAL
WBC URNS QL MICRO: >100 /HPF

## 2017-08-12 PROCEDURE — 85025 COMPLETE CBC W/AUTO DIFF WBC: CPT

## 2017-08-12 PROCEDURE — 81001 URINALYSIS AUTO W/SCOPE: CPT

## 2017-08-12 PROCEDURE — 80053 COMPREHEN METABOLIC PANEL: CPT

## 2017-08-12 PROCEDURE — 74011000258 HC RX REV CODE- 258: Performed by: EMERGENCY MEDICINE

## 2017-08-12 PROCEDURE — 99284 EMERGENCY DEPT VISIT MOD MDM: CPT | Performed by: EMERGENCY MEDICINE

## 2017-08-12 PROCEDURE — 96365 THER/PROPH/DIAG IV INF INIT: CPT | Performed by: EMERGENCY MEDICINE

## 2017-08-12 PROCEDURE — 74011250636 HC RX REV CODE- 250/636: Performed by: EMERGENCY MEDICINE

## 2017-08-12 PROCEDURE — 51700 IRRIGATION OF BLADDER: CPT | Performed by: EMERGENCY MEDICINE

## 2017-08-12 PROCEDURE — 96375 TX/PRO/DX INJ NEW DRUG ADDON: CPT | Performed by: EMERGENCY MEDICINE

## 2017-08-12 RX ORDER — MORPHINE SULFATE 2 MG/ML
4 INJECTION, SOLUTION INTRAMUSCULAR; INTRAVENOUS ONCE
Status: COMPLETED | OUTPATIENT
Start: 2017-08-12 | End: 2017-08-12

## 2017-08-12 RX ORDER — CEPHALEXIN 500 MG/1
500 CAPSULE ORAL 4 TIMES DAILY
Qty: 28 CAP | Refills: 0 | Status: SHIPPED | OUTPATIENT
Start: 2017-08-12 | End: 2017-08-19

## 2017-08-12 RX ADMIN — CEFTRIAXONE 1 G: 1 INJECTION, POWDER, FOR SOLUTION INTRAMUSCULAR; INTRAVENOUS at 17:23

## 2017-08-12 RX ADMIN — MORPHINE SULFATE 4 MG: 2 INJECTION, SOLUTION INTRAMUSCULAR; INTRAVENOUS at 17:24

## 2017-08-12 NOTE — ED TRIAGE NOTES
Pt arrived via ems with the complaint of blood in his urine. Pt states groin pain. Pt coming from nursing home.

## 2017-08-12 NOTE — ED NOTES
This nurse has attempted to call report to [facility] only to get busy signal each time

## 2017-08-12 NOTE — ED NOTES
Report given to Mayra Alvarez at [facility] told that pt needs catheter irrigated 2x a day and to complete full course of antibiotics

## 2017-08-12 NOTE — ED PROVIDER NOTES
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 He states that 3 days ago he was at the urology office for his monthly catheter exchange and after the procedure was done he had a lot of spasms and some blood in the Castillo catheter. He states he has continued to have blood in the catheter and also to leak a little bit of urine around the catheter site and have a small amount drained through the penis. ffice records were reviewed in the medical assistant did document that she had trouble with the catheter changed and the nurse practitioner then irrigated the catheter and place the patient on some medication for spasms. Patient is a 78 y.o. male presenting with hematuria. The history is provided by the patient. Blood in Urine    This is a new problem. The current episode started more than 2 days ago. Associated symptoms include hematuria.         Past Medical History:   Diagnosis Date    Acute pain due to trauma     Anxiety     Bipolar 1 disorder (HCC)     unspecified    Brief psychotic disorder     Chronic UTI      Complicated UTI (urinary tract infection)      Decubitus ulcer     Encephalopathy      GERD (gastroesophageal reflux disease)     Hereditary and idiopathic neuropathy     History of femur fracture     History of kidney stones     History of rectal sphincterotomy     Hx of urinary frequency     Hyperlipidemia     Hyperlipidemia     Hypertensive retinopathy     Hyponatremia     Kidney stone     Major depressive disorder     Muscle weakness     Neurogenic bladder, NOS     Obstructive and reflux uropathy     Paranoid schizophrenia ([address]     Psychiatric disorder     PSDS with psychosis    Quadriplegia ([address] at age 47- per nursing home records    C5-C7 per office note    Scrotal abscess     history of     Sepsis      Suprapubic catheter ([address]     Thyroid disease        Past Surgical History:   Procedure Laterality Date    HX OTHER SURGICAL      Decubitus debridement    HX UROLOGICAL      Kidney Stone Extraction with Stent    FL REMOVAL WITH INSERTION OF SUPRAPUBIC CATHETER           Family History:   Problem Relation Age of Onset    Family history unknown: Yes       Social History     Social History    Marital status:      Spouse name: N/A    Number of children: N/A    Years of education: N/A     Occupational History    Not on file. Social History Main Topics    Smoking status: Never Smoker    Smokeless tobacco: Never Used    Alcohol use No    Drug use: No    Sexual activity: Not on file     Other Topics Concern    Not on file     Social History Narrative         ALLERGIES: Bactrim [sulfamethoprim ds]; Benadryl [diphenhydramine hcl]; Ceftin [cefuroxime axetil]; Cefuroxime; Doyal Monreal; Levaquin [levofloxacin]; Phenergan [promethazine]; Pyridium [phenazopyridine]; and Glenice Postin    Review of Systems   Constitutional: Negative. HENT: Negative. Eyes: Negative. Respiratory: Negative. Cardiovascular: Negative. Endocrine: Negative. Genitourinary: Positive for hematuria. Vitals:    08/12/17 1511 08/12/17 1515   BP: 152/65 117/63   Pulse: (!) 52 (!) 52   Resp: 18    Temp: 98.1 °F (36.7 °C)    SpO2: 95% 94%   Weight: 77.6 kg (171 lb)    Height: 5' 8\" (1.727 m)             Physical Exam   Constitutional: He appears well-developed and well-nourished. HENT:   Head: Normocephalic and atraumatic. Pulmonary/Chest: Effort normal and breath sounds normal.   Abdominal: Soft. There is no tenderness. Suprapubic catheter  Is intact, bloody urine in the Castillo   Genitourinary: Penis normal.   Skin: Skin is warm and dry. No rash noted.         MDM  Number of Diagnoses or Management Options  Hematuria:   Diagnosis management comments: Differential diagnosis includes hematuria, a urinary tract infection, bladder injury, suprapubic Castillo catheter complication    Blood cell count is normal his urine does appear infected and have a lot of red blood cells I discussed the case with Dr. Junella Epley on call for urology he requests that we leave the catheter in place he says we should irrigate it with saline then place the patient on an antibiotic and he will follow him up in the office next week. Amount and/or Complexity of Data Reviewed  Clinical lab tests: ordered and reviewed  Review and summarize past medical records: yes    Risk of Complications, Morbidity, and/or Mortality  Presenting problems: low  Diagnostic procedures: low  Management options: low    Patient Progress  Patient progress: stable    ED Course   5:12 PM  Nurse changed his gallop, diaper, dressings as they were also with urine. We then irrigated the Castillo catheter again with saline and several more clots were removed it is now flowing red urine with small clots this is all likely secondary to the catheter change a few days ago he will get a gram of Rocephin IV here and then I will prescribe some Keflex we will reiterate to the nursing home that they need to irrigate the catheter twice a day and he will follow up with urology next week for recheck. Voice dictation software was used during the making of this note. This software is not perfect and grammatical and other typographical errors may be present. This note has been proofread, but may still contain errors.   Darryl Moore MD; 8/12/2017 @5:13 PM   ===================================================================        Procedures

## 2017-08-12 NOTE — ED NOTES
Pt states he had new urinary catheter placed recently and has been passing clots in urine ever since with groin pain.

## 2017-08-12 NOTE — DISCHARGE INSTRUCTIONS

## 2017-08-12 NOTE — ED NOTES
This RN irrigated pt's suprapubic catheter, had no issue flushing with 60ml cath tip syringe but did have issue pulling back fluid. MD aware.

## 2017-08-18 ENCOUNTER — HOSPITAL ENCOUNTER (OUTPATIENT)
Dept: WOUND CARE | Age: 77
Discharge: HOME | End: 2017-08-18
Attending: PHYSICAL MEDICINE & REHABILITATION
Payer: MEDICARE

## 2017-08-18 PROCEDURE — 99213 OFFICE O/P EST LOW 20 MIN: CPT

## 2017-09-29 ENCOUNTER — HOSPITAL ENCOUNTER (OUTPATIENT)
Dept: WOUND CARE | Age: 77
Discharge: HOME | End: 2017-09-29
Attending: PHYSICAL MEDICINE & REHABILITATION
Payer: MEDICARE

## 2017-09-29 PROCEDURE — 99213 OFFICE O/P EST LOW 20 MIN: CPT

## 2017-11-10 ENCOUNTER — HOSPITAL ENCOUNTER (OUTPATIENT)
Dept: WOUND CARE | Age: 77
Discharge: HOME | End: 2017-11-10
Attending: PHYSICAL MEDICINE & REHABILITATION
Payer: MEDICARE

## 2017-11-10 PROCEDURE — 77030011256 HC DRSG MEPILEX <16IN NO BORD MOLN -A

## 2017-11-10 PROCEDURE — 99213 OFFICE O/P EST LOW 20 MIN: CPT

## 2017-12-29 ENCOUNTER — HOSPITAL ENCOUNTER (OUTPATIENT)
Dept: WOUND CARE | Age: 77
Discharge: HOME | End: 2017-12-29
Attending: PHYSICAL MEDICINE & REHABILITATION
Payer: MEDICARE

## 2017-12-29 PROCEDURE — 99214 OFFICE O/P EST MOD 30 MIN: CPT

## 2018-05-13 ENCOUNTER — HOSPITAL ENCOUNTER (EMERGENCY)
Age: 78
Discharge: HOME | End: 2018-05-13
Attending: EMERGENCY MEDICINE
Payer: MEDICARE

## 2018-05-13 ENCOUNTER — APPOINTMENT (OUTPATIENT)
Dept: GENERAL RADIOLOGY | Age: 78
End: 2018-05-13
Payer: MEDICARE

## 2018-05-13 VITALS
SYSTOLIC BLOOD PRESSURE: 148 MMHG | BODY MASS INDEX: 23.54 KG/M2 | OXYGEN SATURATION: 96 % | DIASTOLIC BLOOD PRESSURE: 67 MMHG | WEIGHT: 150 LBS | RESPIRATION RATE: 14 BRPM | HEIGHT: 67 IN | HEART RATE: 77 BPM | TEMPERATURE: 98.1 F

## 2018-05-13 DIAGNOSIS — R07.89 ATYPICAL CHEST PAIN: Primary | ICD-10-CM

## 2018-05-13 LAB
ALBUMIN SERPL-MCNC: 2.7 G/DL (ref 3.2–4.6)
ALBUMIN/GLOB SERPL: 0.6 {RATIO} (ref 1.2–3.5)
ALP SERPL-CCNC: 71 U/L (ref 50–136)
ALT SERPL-CCNC: 13 U/L (ref 12–65)
ANION GAP SERPL CALC-SCNC: 12 MMOL/L (ref 7–16)
AST SERPL-CCNC: 16 U/L (ref 15–37)
BASOPHILS # BLD: 0 K/UL (ref 0–0.2)
BASOPHILS NFR BLD: 0 % (ref 0–2)
BILIRUB SERPL-MCNC: 0.2 MG/DL (ref 0.2–1.1)
BUN SERPL-MCNC: 16 MG/DL (ref 8–23)
CALCIUM SERPL-MCNC: 8.4 MG/DL (ref 8.3–10.4)
CHLORIDE SERPL-SCNC: 104 MMOL/L (ref 98–107)
CO2 SERPL-SCNC: 22 MMOL/L (ref 21–32)
CREAT SERPL-MCNC: 0.66 MG/DL (ref 0.8–1.5)
DIFFERENTIAL METHOD BLD: ABNORMAL
EOSINOPHIL # BLD: 0.3 K/UL (ref 0–0.8)
EOSINOPHIL NFR BLD: 4 % (ref 0.5–7.8)
ERYTHROCYTE [DISTWIDTH] IN BLOOD BY AUTOMATED COUNT: 18 % (ref 11.9–14.6)
GLOBULIN SER CALC-MCNC: 4.5 G/DL (ref 2.3–3.5)
GLUCOSE SERPL-MCNC: 135 MG/DL (ref 65–100)
HCT VFR BLD AUTO: 32.9 % (ref 41.1–50.3)
HGB BLD-MCNC: 10.2 G/DL (ref 13.6–17.2)
IMM GRANULOCYTES # BLD: 0 K/UL (ref 0–0.5)
IMM GRANULOCYTES NFR BLD AUTO: 0 % (ref 0–5)
LYMPHOCYTES # BLD: 1.6 K/UL (ref 0.5–4.6)
LYMPHOCYTES NFR BLD: 21 % (ref 13–44)
MCH RBC QN AUTO: 25.2 PG (ref 26.1–32.9)
MCHC RBC AUTO-ENTMCNC: 31 G/DL (ref 31.4–35)
MCV RBC AUTO: 81.2 FL (ref 79.6–97.8)
MONOCYTES # BLD: 0.5 K/UL (ref 0.1–1.3)
MONOCYTES NFR BLD: 6 % (ref 4–12)
NEUTS SEG # BLD: 5.1 K/UL (ref 1.7–8.2)
NEUTS SEG NFR BLD: 69 % (ref 43–78)
PLATELET # BLD AUTO: 286 K/UL (ref 150–450)
PMV BLD AUTO: 8.4 FL (ref 10.8–14.1)
POTASSIUM SERPL-SCNC: 4.2 MMOL/L (ref 3.5–5.1)
PROT SERPL-MCNC: 7.2 G/DL (ref 6.3–8.2)
RBC # BLD AUTO: 4.05 M/UL (ref 4.23–5.67)
SODIUM SERPL-SCNC: 138 MMOL/L (ref 136–145)
TROPONIN I BLD-MCNC: 0 NG/ML (ref 0.02–0.05)
TROPONIN I BLD-MCNC: 0 NG/ML (ref 0.02–0.05)
TROPONIN I SERPL-MCNC: <0.02 NG/ML (ref 0.02–0.05)
WBC # BLD AUTO: 7.4 K/UL (ref 4.3–11.1)

## 2018-05-13 PROCEDURE — 71045 X-RAY EXAM CHEST 1 VIEW: CPT

## 2018-05-13 PROCEDURE — 84484 ASSAY OF TROPONIN QUANT: CPT

## 2018-05-13 PROCEDURE — 99285 EMERGENCY DEPT VISIT HI MDM: CPT | Performed by: EMERGENCY MEDICINE

## 2018-05-13 PROCEDURE — 93005 ELECTROCARDIOGRAM TRACING: CPT

## 2018-05-13 PROCEDURE — 80053 COMPREHEN METABOLIC PANEL: CPT

## 2018-05-13 PROCEDURE — 85025 COMPLETE CBC W/AUTO DIFF WBC: CPT

## 2018-05-13 RX ORDER — ASPIRIN 325 MG
325 TABLET ORAL DAILY
Status: ON HOLD | COMMUNITY
End: 2018-06-20

## 2018-05-13 RX ORDER — DEXTROMETHORPHAN POLISTIREX 30 MG/5 ML
118 SUSPENSION, EXTENDED RELEASE 12 HR ORAL
COMMUNITY
End: 2019-08-30

## 2018-05-13 RX ORDER — PROPANTHELINE BROMIDE 15 MG/1
15 TABLET, FILM COATED ORAL 2 TIMES DAILY
Status: ON HOLD | COMMUNITY
End: 2019-09-03

## 2018-05-13 RX ORDER — METHYLPHENIDATE HYDROCHLORIDE 5 MG/1
5 TABLET ORAL
Status: ON HOLD | COMMUNITY
End: 2018-06-20

## 2018-05-13 NOTE — ED NOTES
I have reviewed discharge instructions with the patient. The patient verbalized understanding. Patient left ED via Discharge Method: stretcher to Nursing Home with self via EMS. Opportunity for questions and clarification provided. Patient given 0 scripts. EMS for transport to [address]. To continue your aftercare when you leave the hospital, you may receive an automated call from our care team to check in on how you are doing. This is a free service and part of our promise to provide the best care and service to meet your aftercare needs.  If you have questions, or wish to unsubscribe from this service please call [phone]. Thank you for Choosing our [facility] Emergency Department.

## 2018-05-13 NOTE — ED TRIAGE NOTES
Patient from [facility] for rapid heart rate. EMS reports pulse \"around 120. \" Patient states that this started about 1 hour ago.  Patient reports \"feeling weird, arms numb, tightness in his chest.\"

## 2018-05-13 NOTE — DISCHARGE INSTRUCTIONS
Chest Pain: Care Instructions  Your Care Instructions    There are many things that can cause chest pain. Some are not serious and will get better on their own in a few days. But some kinds of chest pain need more testing and treatment. Your doctor may have recommended a follow-up visit in the next 8 to 12 hours. If you are not getting better, you may need more tests or treatment. Even though your doctor has released you, you still need to watch for any problems. The doctor carefully checked you, but sometimes problems can develop later. If you have new symptoms or if your symptoms do not get better, get medical care right away. If you have worse or different chest pain or pressure that lasts more than 5 minutes or you passed out (lost consciousness), call 911 or seek other emergency help right away. A medical visit is only one step in your treatment. Even if you feel better, you still need to do what your doctor recommends, such as going to all suggested follow-up appointments and taking medicines exactly as directed. This will help you recover and help prevent future problems. How can you care for yourself at home? · Rest until you feel better. · Take your medicine exactly as prescribed. Call your doctor if you think you are having a problem with your medicine. · Do not drive after taking a prescription pain medicine. When should you call for help? Call 911 if:  ? · You passed out (lost consciousness). ? · You have severe difficulty breathing. ? · You have symptoms of a heart attack. These may include:  ¨ Chest pain or pressure, or a strange feeling in your chest.  ¨ Sweating. ¨ Shortness of breath. ¨ Nausea or vomiting. ¨ Pain, pressure, or a strange feeling in your back, neck, jaw, or upper belly or in one or both shoulders or arms. ¨ Lightheadedness or sudden weakness. ¨ A fast or irregular heartbeat.   After you call 911, the  may tell you to chew 1 adult-strength or 2 to 4 low-dose aspirin. Wait for an ambulance. Do not try to drive yourself. ?Call your doctor today if:  ? · You have any trouble breathing. ? · Your chest pain gets worse. ? · You are dizzy or lightheaded, or you feel like you may faint. ? · You are not getting better as expected. ? · You are having new or different chest pain. Where can you learn more? Go to http://tate-celestina.info/. Enter A120 in the search box to learn more about \"Chest Pain: Care Instructions. \"  Current as of: March 20, 2017  Content Version: 11.4  © 1699-2905 [employer]. Care instructions adapted under license by [employer] (which disclaims liability or warranty for this information). If you have questions about a medical condition or this instruction, always ask your healthcare professional. [address] any warranty or liability for your use of this information.

## 2018-05-13 NOTE — ED PROVIDER NOTES
HPI Comments: Patient is a 59-year-old male who arrives in the emergency department via EMS from his nursing home for evaluation of some chest tightness and rapid heart rate. He states shortly after eating lunch this afternoon he had a little bit of vague tightness in his chest and noticed his heart rate to be fast.  He states the staff put him back in bed. His heart rate remained elevated so they called EMS. He states he feels much better now and has no complaints of chest pain dyspnea or palpitations currently. Patient is a 78 y.o. male presenting with palpitations. The history is provided by the patient, the nursing home and the EMS personnel. Palpitations    This is a new problem. The current episode started 1 to 2 hours ago. The problem has been resolved. The problem occurs constantly. The problem is associated with nothing. Associated symptoms include chest pain and irregular heartbeat. Pertinent negatives include no diaphoresis, no fever, no numbness and no abdominal pain. He has tried nothing for the symptoms. His past medical history is significant for anemia. His past medical history does not include past MI, atrial fibrillation or congenital heart disease.         Past Medical History:   Diagnosis Date    Acute pain due to trauma     Anxiety     Bipolar 1 disorder (HCC)     unspecified    Brief psychotic disorder     Chronic UTI      Complicated UTI (urinary tract infection)      Decubitus ulcer     Encephalopathy      GERD (gastroesophageal reflux disease)     Hereditary and idiopathic neuropathy     History of femur fracture     History of kidney stones     History of rectal sphincterotomy     Hx of urinary frequency     Hyperlipidemia     Hyperlipidemia     Hypertensive retinopathy     Hyponatremia     Kidney stone     Major depressive disorder     Muscle weakness     Neurogenic bladder, NOS     Obstructive and reflux uropathy     [facility] [address]     Psychiatric disorder     PSDS with psychosis    Quadriplegia ([facility] [address] at age 47- per nursing home records    C5-C7 per office note    Scrotal abscess     history of     Sepsis      Suprapubic catheter ([facility] [address]     Thyroid disease        Past Surgical History:   Procedure Laterality Date    HX OTHER SURGICAL      Decubitus debridement    HX UROLOGICAL      Kidney Stone Extraction with Stent    VT REMOVAL WITH INSERTION OF SUPRAPUBIC CATHETER           Family History:   Problem Relation Age of Onset    Family history unknown: Yes       Social History     Social History    Marital status:      Spouse name: N/A    Number of children: N/A    Years of education: N/A     Occupational History    Not on file. Social History Main Topics    Smoking status: Never Smoker    Smokeless tobacco: Never Used    Alcohol use No    Drug use: No    Sexual activity: Not on file     Other Topics Concern    Not on file     Social History Narrative         ALLERGIES: Bactrim [sulfamethoprim ds]; Benadryl [diphenhydramine hcl]; Ceftin [cefuroxime axetil]; Cefuroxime; Doyal Monreal; Levaquin [levofloxacin]; Phenergan [promethazine]; Pyridium [phenazopyridine]; and Glenice Postin    Review of Systems   Constitutional: Negative for diaphoresis and fever. HENT: Negative. Eyes: Negative. Respiratory: Negative. Cardiovascular: Positive for chest pain and palpitations. Gastrointestinal: Negative for abdominal pain. Endocrine: Negative. Musculoskeletal: Negative. Skin: Negative. Neurological: Negative for numbness. Hematological: Negative. Vitals:    05/13/18 1419   BP: (!) 137/98   Pulse: 94   Resp: 16   Temp: 97.8 °F (36.6 °C)   SpO2: 97%   Weight: 68 kg (150 lb)   Height: 5' 7\" (1.702 m)            Physical Exam   Constitutional: He is oriented to person, place, and time. He appears well-developed and well-nourished. HENT:   Head: Normocephalic and atraumatic.    Neck: Normal range of motion. Neck supple. Cardiovascular: Normal rate. An irregular rhythm present. Pulmonary/Chest: Effort normal and breath sounds normal.   Abdominal: Soft. There is no tenderness. Musculoskeletal: He exhibits no edema. Neurological: He is alert and oriented to person, place, and time. A cranial nerve deficit is present. GCS eye subscore is 4. GCS verbal subscore is 5. GCS motor subscore is 6. Chronic paraplegic   Skin: There is pallor. MDM  Number of Diagnoses or Management Options  Diagnosis management comments: Differential diagnosis includes angina, gastroesophageal reflux, myocardial infarction, arrhythmia    EKG shows atrial fibrillation with a rate of 101, no acute ischemic changes       Amount and/or Complexity of Data Reviewed  Clinical lab tests: ordered and reviewed  Tests in the radiology section of CPT®: ordered and reviewed  Review and summarize past medical records: yes  Independent visualization of images, tracings, or specimens: yes    Risk of Complications, Morbidity, and/or Mortality  Presenting problems: moderate  Diagnostic procedures: moderate  Management options: moderate    Patient Progress  Patient progress: stable        ED Course   5:31 PM  Blood work is unremarkable. Heart rate has remained in the 80s here. Chest x-ray is negative. He has had no further chest pain or dyspnea. Troponin and repeat troponin values remained 0. I see no sign of acute coronary syndrome. On reviewing his records he has had atrial fib and water in the past on EKG several years ago. With his rate remaining normal I do not think he needs admission to the hospital today. We will discharge him back to his living facility. Voice dictation software was used during the making of this note. This software is not perfect and grammatical and other typographical errors may be present. This note has been proofread, but may still contain errors.   Darryl Moore MD; 5/13/2018 @5:31 PM ===================================================================        Procedures

## 2018-05-13 NOTE — ED NOTES
Pt resting in bed. Castillo bag emptied 550ml. Pt given half cup of water. Pt tolerated well with no signs of distress.

## 2018-05-14 LAB
ATRIAL RATE: 105 BPM
CALCULATED R AXIS, ECG10: 46 DEGREES
CALCULATED T AXIS, ECG11: 55 DEGREES
DIAGNOSIS, 93000: NORMAL
Q-T INTERVAL, ECG07: 348 MS
QRS DURATION, ECG06: 66 MS
QTC CALCULATION (BEZET), ECG08: 451 MS
VENTRICULAR RATE, ECG03: 101 BPM

## 2018-06-17 ENCOUNTER — HOSPITAL ENCOUNTER (OUTPATIENT)
Dept: LAB | Age: 78
Discharge: HOME | End: 2018-06-17

## 2018-06-17 LAB
HCT VFR BLD AUTO: 31.6 % (ref 41.1–50.3)
HGB BLD-MCNC: 9.7 G/DL (ref 13.6–17.2)

## 2018-06-17 PROCEDURE — 85018 HEMOGLOBIN: CPT

## 2018-06-19 ENCOUNTER — HOSPITAL ENCOUNTER (INPATIENT)
Age: 78
LOS: 1 days | Discharge: HOME | DRG: 698 | End: 2018-06-21
Attending: EMERGENCY MEDICINE | Admitting: INTERNAL MEDICINE
Payer: MEDICARE

## 2018-06-19 ENCOUNTER — APPOINTMENT (OUTPATIENT)
Dept: GENERAL RADIOLOGY | Age: 78
DRG: 698 | End: 2018-06-19
Attending: EMERGENCY MEDICINE
Payer: MEDICARE

## 2018-06-19 DIAGNOSIS — T83.511A URINARY TRACT INFECTION ASSOCIATED WITH INDWELLING URETHRAL CATHETER, INITIAL ENCOUNTER (HCC): Primary | ICD-10-CM

## 2018-06-19 DIAGNOSIS — N39.0 URINARY TRACT INFECTION ASSOCIATED WITH INDWELLING URETHRAL CATHETER, INITIAL ENCOUNTER (HCC): Primary | ICD-10-CM

## 2018-06-19 DIAGNOSIS — R06.02 SOB (SHORTNESS OF BREATH): ICD-10-CM

## 2018-06-19 PROBLEM — E87.1 HYPONATREMIA: Status: ACTIVE | Noted: 2018-06-19

## 2018-06-19 LAB
ALBUMIN SERPL-MCNC: 2.7 G/DL (ref 3.2–4.6)
ALBUMIN/GLOB SERPL: 0.6 {RATIO} (ref 1.2–3.5)
ALP SERPL-CCNC: 64 U/L (ref 50–136)
ALT SERPL-CCNC: 10 U/L (ref 12–65)
ANION GAP SERPL CALC-SCNC: 12 MMOL/L (ref 7–16)
AST SERPL-CCNC: 17 U/L (ref 15–37)
BASOPHILS # BLD: 0 K/UL (ref 0–0.2)
BASOPHILS NFR BLD: 0 % (ref 0–2)
BILIRUB SERPL-MCNC: 0.5 MG/DL (ref 0.2–1.1)
BUN SERPL-MCNC: 18 MG/DL (ref 8–23)
CALCIUM SERPL-MCNC: 8.3 MG/DL (ref 8.3–10.4)
CHLORIDE SERPL-SCNC: 97 MMOL/L (ref 98–107)
CO2 SERPL-SCNC: 22 MMOL/L (ref 21–32)
CREAT SERPL-MCNC: 0.73 MG/DL (ref 0.8–1.5)
DIFFERENTIAL METHOD BLD: ABNORMAL
EOSINOPHIL # BLD: 0.1 K/UL (ref 0–0.8)
EOSINOPHIL NFR BLD: 1 % (ref 0.5–7.8)
ERYTHROCYTE [DISTWIDTH] IN BLOOD BY AUTOMATED COUNT: 16.6 % (ref 11.9–14.6)
GLOBULIN SER CALC-MCNC: 4.5 G/DL (ref 2.3–3.5)
GLUCOSE SERPL-MCNC: 86 MG/DL (ref 65–100)
HCT VFR BLD AUTO: 30 % (ref 41.1–50.3)
HGB BLD-MCNC: 9.4 G/DL (ref 13.6–17.2)
IMM GRANULOCYTES # BLD: 0 K/UL (ref 0–0.5)
IMM GRANULOCYTES NFR BLD AUTO: 0 % (ref 0–5)
LACTATE BLD-SCNC: 0.7 MMOL/L (ref 0.5–1.9)
LYMPHOCYTES # BLD: 1.2 K/UL (ref 0.5–4.6)
LYMPHOCYTES NFR BLD: 10 % (ref 13–44)
MCH RBC QN AUTO: 25.2 PG (ref 26.1–32.9)
MCHC RBC AUTO-ENTMCNC: 31.3 G/DL (ref 31.4–35)
MCV RBC AUTO: 80.4 FL (ref 79.6–97.8)
MONOCYTES # BLD: 0.5 K/UL (ref 0.1–1.3)
MONOCYTES NFR BLD: 4 % (ref 4–12)
NEUTS SEG # BLD: 10.1 K/UL (ref 1.7–8.2)
NEUTS SEG NFR BLD: 85 % (ref 43–78)
PLATELET # BLD AUTO: 229 K/UL (ref 150–450)
PMV BLD AUTO: 8.7 FL (ref 10.8–14.1)
POTASSIUM SERPL-SCNC: 5 MMOL/L (ref 3.5–5.1)
PROCALCITONIN SERPL-MCNC: <0.1 NG/ML
PROT SERPL-MCNC: 7.2 G/DL (ref 6.3–8.2)
RBC # BLD AUTO: 3.73 M/UL (ref 4.23–5.67)
SODIUM SERPL-SCNC: 131 MMOL/L (ref 136–145)
TROPONIN I BLD-MCNC: 0 NG/ML (ref 0.02–0.05)
WBC # BLD AUTO: 11.9 K/UL (ref 4.3–11.1)

## 2018-06-19 PROCEDURE — 80053 COMPREHEN METABOLIC PANEL: CPT | Performed by: EMERGENCY MEDICINE

## 2018-06-19 PROCEDURE — 87153 DNA/RNA SEQUENCING: CPT

## 2018-06-19 PROCEDURE — 87088 URINE BACTERIA CULTURE: CPT | Performed by: NURSE PRACTITIONER

## 2018-06-19 PROCEDURE — 87040 BLOOD CULTURE FOR BACTERIA: CPT | Performed by: EMERGENCY MEDICINE

## 2018-06-19 PROCEDURE — 84145 PROCALCITONIN (PCT): CPT | Performed by: EMERGENCY MEDICINE

## 2018-06-19 PROCEDURE — 96360 HYDRATION IV INFUSION INIT: CPT | Performed by: EMERGENCY MEDICINE

## 2018-06-19 PROCEDURE — 71045 X-RAY EXAM CHEST 1 VIEW: CPT

## 2018-06-19 PROCEDURE — 74011250636 HC RX REV CODE- 250/636: Performed by: EMERGENCY MEDICINE

## 2018-06-19 PROCEDURE — 93005 ELECTROCARDIOGRAM TRACING: CPT | Performed by: EMERGENCY MEDICINE

## 2018-06-19 PROCEDURE — 87205 SMEAR GRAM STAIN: CPT | Performed by: EMERGENCY MEDICINE

## 2018-06-19 PROCEDURE — 81003 URINALYSIS AUTO W/O SCOPE: CPT | Performed by: EMERGENCY MEDICINE

## 2018-06-19 PROCEDURE — 99285 EMERGENCY DEPT VISIT HI MDM: CPT | Performed by: EMERGENCY MEDICINE

## 2018-06-19 PROCEDURE — 81015 MICROSCOPIC EXAM OF URINE: CPT | Performed by: EMERGENCY MEDICINE

## 2018-06-19 PROCEDURE — 83605 ASSAY OF LACTIC ACID: CPT

## 2018-06-19 PROCEDURE — 96361 HYDRATE IV INFUSION ADD-ON: CPT | Performed by: EMERGENCY MEDICINE

## 2018-06-19 PROCEDURE — 87086 URINE CULTURE/COLONY COUNT: CPT | Performed by: NURSE PRACTITIONER

## 2018-06-19 PROCEDURE — 87186 SC STD MICRODIL/AGAR DIL: CPT | Performed by: NURSE PRACTITIONER

## 2018-06-19 PROCEDURE — 0T2BX0Z CHANGE DRAINAGE DEVICE IN BLADDER, EXTERNAL APPROACH: ICD-10-PCS | Performed by: EMERGENCY MEDICINE

## 2018-06-19 PROCEDURE — 87076 CULTURE ANAEROBE IDENT EACH: CPT

## 2018-06-19 PROCEDURE — 84484 ASSAY OF TROPONIN QUANT: CPT

## 2018-06-19 PROCEDURE — 85025 COMPLETE CBC W/AUTO DIFF WBC: CPT | Performed by: EMERGENCY MEDICINE

## 2018-06-19 RX ORDER — SODIUM CHLORIDE 9 MG/ML
75 INJECTION, SOLUTION INTRAVENOUS CONTINUOUS
Status: CANCELLED | OUTPATIENT
Start: 2018-06-19

## 2018-06-19 RX ADMIN — SODIUM CHLORIDE 1000 ML: 900 INJECTION, SOLUTION INTRAVENOUS at 20:42

## 2018-06-19 RX ADMIN — SODIUM CHLORIDE 1000 ML: 900 INJECTION, SOLUTION INTRAVENOUS at 23:34

## 2018-06-20 PROBLEM — A41.9 SEPSIS (HCC): Status: ACTIVE | Noted: 2018-06-20

## 2018-06-20 LAB
ALBUMIN SERPL-MCNC: 2.2 G/DL (ref 3.2–4.6)
ALBUMIN/GLOB SERPL: 0.5 {RATIO} (ref 1.2–3.5)
ALP SERPL-CCNC: 57 U/L (ref 50–136)
ALT SERPL-CCNC: 10 U/L (ref 12–65)
ANION GAP SERPL CALC-SCNC: 7 MMOL/L (ref 7–16)
AST SERPL-CCNC: 10 U/L (ref 15–37)
ATRIAL RATE: 326 BPM
ATRIAL RATE: 394 BPM
BACTERIA URNS QL MICRO: ABNORMAL /HPF
BASOPHILS # BLD: 0 K/UL (ref 0–0.2)
BASOPHILS NFR BLD: 0 % (ref 0–2)
BILIRUB SERPL-MCNC: 0.3 MG/DL (ref 0.2–1.1)
BUN SERPL-MCNC: 17 MG/DL (ref 8–23)
CALCIUM SERPL-MCNC: 7.4 MG/DL (ref 8.3–10.4)
CALCULATED P AXIS, ECG09: 0 DEGREES
CALCULATED R AXIS, ECG10: 56 DEGREES
CALCULATED R AXIS, ECG10: 69 DEGREES
CALCULATED T AXIS, ECG11: 62 DEGREES
CALCULATED T AXIS, ECG11: 66 DEGREES
CASTS URNS QL MICRO: 0 /LPF
CHLORIDE SERPL-SCNC: 105 MMOL/L (ref 98–107)
CO2 SERPL-SCNC: 25 MMOL/L (ref 21–32)
CREAT SERPL-MCNC: 0.72 MG/DL (ref 0.8–1.5)
CRYSTALS URNS QL MICRO: 0 /LPF
DIAGNOSIS, 93000: NORMAL
DIAGNOSIS, 93000: NORMAL
DIFFERENTIAL METHOD BLD: ABNORMAL
EOSINOPHIL # BLD: 0.1 K/UL (ref 0–0.8)
EOSINOPHIL NFR BLD: 1 % (ref 0.5–7.8)
EPI CELLS #/AREA URNS HPF: ABNORMAL /HPF
ERYTHROCYTE [DISTWIDTH] IN BLOOD BY AUTOMATED COUNT: 16.8 % (ref 11.9–14.6)
GLOBULIN SER CALC-MCNC: 4.1 G/DL (ref 2.3–3.5)
GLUCOSE SERPL-MCNC: 125 MG/DL (ref 65–100)
HCT VFR BLD AUTO: 28.6 % (ref 41.1–50.3)
HGB BLD-MCNC: 8.8 G/DL (ref 13.6–17.2)
IMM GRANULOCYTES # BLD: 0 K/UL (ref 0–0.5)
IMM GRANULOCYTES NFR BLD AUTO: 0 % (ref 0–5)
LYMPHOCYTES # BLD: 2.3 K/UL (ref 0.5–4.6)
LYMPHOCYTES NFR BLD: 21 % (ref 13–44)
MCH RBC QN AUTO: 25.3 PG (ref 26.1–32.9)
MCHC RBC AUTO-ENTMCNC: 30.8 G/DL (ref 31.4–35)
MCV RBC AUTO: 82.2 FL (ref 79.6–97.8)
MONOCYTES # BLD: 0.5 K/UL (ref 0.1–1.3)
MONOCYTES NFR BLD: 5 % (ref 4–12)
MUCOUS THREADS URNS QL MICRO: 0 /LPF
NEUTS SEG # BLD: 8 K/UL (ref 1.7–8.2)
NEUTS SEG NFR BLD: 73 % (ref 43–78)
OTHER OBSERVATIONS,UCOM: ABNORMAL
PLATELET # BLD AUTO: 195 K/UL (ref 150–450)
PMV BLD AUTO: 9.1 FL (ref 10.8–14.1)
POTASSIUM SERPL-SCNC: 4.2 MMOL/L (ref 3.5–5.1)
PROT SERPL-MCNC: 6.3 G/DL (ref 6.3–8.2)
Q-T INTERVAL, ECG07: 354 MS
Q-T INTERVAL, ECG07: 426 MS
QRS DURATION, ECG06: 66 MS
QRS DURATION, ECG06: 70 MS
QTC CALCULATION (BEZET), ECG08: 415 MS
QTC CALCULATION (BEZET), ECG08: 446 MS
RBC # BLD AUTO: 3.48 M/UL (ref 4.23–5.67)
RBC #/AREA URNS HPF: ABNORMAL /HPF
SODIUM SERPL-SCNC: 137 MMOL/L (ref 136–145)
TOBRAMYCIN SERPL-MCNC: 4.7 UG/ML
VENTRICULAR RATE, ECG03: 66 BPM
VENTRICULAR RATE, ECG03: 83 BPM
WBC # BLD AUTO: 10.9 K/UL (ref 4.3–11.1)
WBC URNS QL MICRO: >100 /HPF

## 2018-06-20 PROCEDURE — 80053 COMPREHEN METABOLIC PANEL: CPT | Performed by: INTERNAL MEDICINE

## 2018-06-20 PROCEDURE — 74011250637 HC RX REV CODE- 250/637: Performed by: NURSE PRACTITIONER

## 2018-06-20 PROCEDURE — 92610 EVALUATE SWALLOWING FUNCTION: CPT

## 2018-06-20 PROCEDURE — 94760 N-INVAS EAR/PLS OXIMETRY 1: CPT

## 2018-06-20 PROCEDURE — 85025 COMPLETE CBC W/AUTO DIFF WBC: CPT | Performed by: INTERNAL MEDICINE

## 2018-06-20 PROCEDURE — 94640 AIRWAY INHALATION TREATMENT: CPT

## 2018-06-20 PROCEDURE — 74011250637 HC RX REV CODE- 250/637: Performed by: INTERNAL MEDICINE

## 2018-06-20 PROCEDURE — 74011000258 HC RX REV CODE- 258: Performed by: INTERNAL MEDICINE

## 2018-06-20 PROCEDURE — 36415 COLL VENOUS BLD VENIPUNCTURE: CPT | Performed by: INTERNAL MEDICINE

## 2018-06-20 PROCEDURE — 74011000250 HC RX REV CODE- 250: Performed by: INTERNAL MEDICINE

## 2018-06-20 PROCEDURE — 65270000029 HC RM PRIVATE

## 2018-06-20 PROCEDURE — 77030018846 HC SOL IRR STRL H20 ICUM -A

## 2018-06-20 PROCEDURE — 93005 ELECTROCARDIOGRAM TRACING: CPT | Performed by: INTERNAL MEDICINE

## 2018-06-20 PROCEDURE — 77010033678 HC OXYGEN DAILY

## 2018-06-20 PROCEDURE — 77030020263 HC SOL INJ SOD CL0.9% LFCR 1000ML

## 2018-06-20 PROCEDURE — 74011250636 HC RX REV CODE- 250/636: Performed by: EMERGENCY MEDICINE

## 2018-06-20 PROCEDURE — 74011250636 HC RX REV CODE- 250/636: Performed by: INTERNAL MEDICINE

## 2018-06-20 PROCEDURE — 74011000258 HC RX REV CODE- 258: Performed by: EMERGENCY MEDICINE

## 2018-06-20 PROCEDURE — 80200 ASSAY OF TOBRAMYCIN: CPT | Performed by: INTERNAL MEDICINE

## 2018-06-20 RX ORDER — OLANZAPINE 2.5 MG/1
5 TABLET ORAL
Status: DISCONTINUED | OUTPATIENT
Start: 2018-06-20 | End: 2018-06-21 | Stop reason: HOSPADM

## 2018-06-20 RX ORDER — METHADONE HYDROCHLORIDE 5 MG/1
5 TABLET ORAL EVERY 8 HOURS
Status: ON HOLD | COMMUNITY
End: 2019-08-30

## 2018-06-20 RX ORDER — POLYETHYLENE GLYCOL 3350 17 G/17G
17 POWDER, FOR SOLUTION ORAL DAILY
Status: DISCONTINUED | OUTPATIENT
Start: 2018-06-20 | End: 2018-06-21 | Stop reason: HOSPADM

## 2018-06-20 RX ORDER — ASPIRIN 325 MG
325 TABLET ORAL DAILY
Status: DISCONTINUED | OUTPATIENT
Start: 2018-06-20 | End: 2018-06-21 | Stop reason: HOSPADM

## 2018-06-20 RX ORDER — DIVALPROEX SODIUM 125 MG/1
125 TABLET, DELAYED RELEASE ORAL
Status: DISCONTINUED | OUTPATIENT
Start: 2018-06-20 | End: 2018-06-21 | Stop reason: HOSPADM

## 2018-06-20 RX ORDER — FACIAL-BODY WIPES
10 EACH TOPICAL DAILY PRN
Status: DISCONTINUED | OUTPATIENT
Start: 2018-06-20 | End: 2018-06-21 | Stop reason: HOSPADM

## 2018-06-20 RX ORDER — DIVALPROEX SODIUM 250 MG/1
250 TABLET, DELAYED RELEASE ORAL
Status: ON HOLD | COMMUNITY
End: 2019-09-03

## 2018-06-20 RX ORDER — OXYBUTYNIN CHLORIDE 5 MG/1
15 TABLET, EXTENDED RELEASE ORAL
Status: DISCONTINUED | OUTPATIENT
Start: 2018-06-20 | End: 2018-06-21 | Stop reason: HOSPADM

## 2018-06-20 RX ORDER — SERTRALINE HYDROCHLORIDE 50 MG/1
50 TABLET, FILM COATED ORAL DAILY
Status: DISCONTINUED | OUTPATIENT
Start: 2018-06-20 | End: 2018-06-21 | Stop reason: HOSPADM

## 2018-06-20 RX ORDER — ONDANSETRON 4 MG/1
4 TABLET, FILM COATED ORAL
COMMUNITY

## 2018-06-20 RX ORDER — IPRATROPIUM BROMIDE AND ALBUTEROL SULFATE 2.5; .5 MG/3ML; MG/3ML
3 SOLUTION RESPIRATORY (INHALATION)
Status: DISCONTINUED | OUTPATIENT
Start: 2018-06-20 | End: 2018-06-21 | Stop reason: HOSPADM

## 2018-06-20 RX ORDER — METHENAMINE HIPPURATE 1000 MG/1
1 TABLET ORAL 2 TIMES DAILY WITH MEALS
Status: ON HOLD | COMMUNITY
End: 2019-09-03

## 2018-06-20 RX ORDER — ENOXAPARIN SODIUM 100 MG/ML
30 INJECTION SUBCUTANEOUS EVERY 12 HOURS
Status: DISCONTINUED | OUTPATIENT
Start: 2018-06-20 | End: 2018-06-20

## 2018-06-20 RX ORDER — ADHESIVE BANDAGE
30 BANDAGE TOPICAL DAILY PRN
Status: ON HOLD | COMMUNITY
End: 2019-09-03

## 2018-06-20 RX ORDER — SODIUM CHLORIDE 0.9 % (FLUSH) 0.9 %
5-10 SYRINGE (ML) INJECTION AS NEEDED
Status: DISCONTINUED | OUTPATIENT
Start: 2018-06-20 | End: 2018-06-21 | Stop reason: HOSPADM

## 2018-06-20 RX ORDER — MIRTAZAPINE 15 MG/1
15 TABLET, FILM COATED ORAL
COMMUNITY
End: 2019-08-30

## 2018-06-20 RX ORDER — METOPROLOL TARTRATE 25 MG/1
25 TABLET, FILM COATED ORAL 2 TIMES DAILY
Status: ON HOLD | COMMUNITY
End: 2019-09-05 | Stop reason: SDUPTHER

## 2018-06-20 RX ORDER — ASPIRIN 325 MG
50000 TABLET, DELAYED RELEASE (ENTERIC COATED) ORAL
Status: ON HOLD | COMMUNITY
End: 2019-09-03

## 2018-06-20 RX ORDER — ENOXAPARIN SODIUM 100 MG/ML
40 INJECTION SUBCUTANEOUS EVERY 24 HOURS
Status: DISCONTINUED | OUTPATIENT
Start: 2018-06-20 | End: 2018-06-21 | Stop reason: HOSPADM

## 2018-06-20 RX ORDER — SODIUM CHLORIDE 9 MG/ML
150 INJECTION, SOLUTION INTRAVENOUS CONTINUOUS
Status: DISCONTINUED | OUTPATIENT
Start: 2018-06-20 | End: 2018-06-21

## 2018-06-20 RX ORDER — MIDODRINE HYDROCHLORIDE 5 MG/1
5 TABLET ORAL
Status: DISCONTINUED | OUTPATIENT
Start: 2018-06-20 | End: 2018-06-21 | Stop reason: HOSPADM

## 2018-06-20 RX ORDER — MIDODRINE HYDROCHLORIDE 5 MG/1
5 TABLET ORAL ONCE
Status: COMPLETED | OUTPATIENT
Start: 2018-06-20 | End: 2018-06-20

## 2018-06-20 RX ADMIN — MIDODRINE HYDROCHLORIDE 5 MG: 5 TABLET ORAL at 18:35

## 2018-06-20 RX ADMIN — IPRATROPIUM BROMIDE AND ALBUTEROL SULFATE 3 ML: .5; 3 SOLUTION RESPIRATORY (INHALATION) at 11:39

## 2018-06-20 RX ADMIN — SODIUM CHLORIDE 250 ML/HR: 900 INJECTION, SOLUTION INTRAVENOUS at 12:00

## 2018-06-20 RX ADMIN — SODIUM CHLORIDE 1 G: 900 INJECTION, SOLUTION INTRAVENOUS at 04:23

## 2018-06-20 RX ADMIN — MIDODRINE HYDROCHLORIDE 5 MG: 5 TABLET ORAL at 15:05

## 2018-06-20 RX ADMIN — Medication 1 AMPULE: at 21:01

## 2018-06-20 RX ADMIN — SODIUM CHLORIDE 150 ML/HR: 900 INJECTION, SOLUTION INTRAVENOUS at 10:00

## 2018-06-20 RX ADMIN — ASPIRIN 325 MG ORAL TABLET 325 MG: 325 PILL ORAL at 08:45

## 2018-06-20 RX ADMIN — CEFEPIME HYDROCHLORIDE 2 G: 2 INJECTION, POWDER, FOR SOLUTION INTRAVENOUS at 17:07

## 2018-06-20 RX ADMIN — SERTRALINE HYDROCHLORIDE 50 MG: 50 TABLET ORAL at 08:45

## 2018-06-20 RX ADMIN — OXYBUTYNIN CHLORIDE 15 MG: 5 TABLET, EXTENDED RELEASE ORAL at 08:45

## 2018-06-20 RX ADMIN — IPRATROPIUM BROMIDE AND ALBUTEROL SULFATE 3 ML: .5; 3 SOLUTION RESPIRATORY (INHALATION) at 20:54

## 2018-06-20 RX ADMIN — OLANZAPINE 5 MG: 2.5 TABLET, FILM COATED ORAL at 04:23

## 2018-06-20 RX ADMIN — VANCOMYCIN HYDROCHLORIDE 1000 MG: 1 INJECTION, POWDER, LYOPHILIZED, FOR SOLUTION INTRAVENOUS at 20:55

## 2018-06-20 RX ADMIN — TOBRAMYCIN 325 MG: 40 INJECTION, SOLUTION INTRAMUSCULAR; INTRAVENOUS at 00:24

## 2018-06-20 RX ADMIN — DIVALPROEX SODIUM 125 MG: 125 TABLET, DELAYED RELEASE ORAL at 20:56

## 2018-06-20 RX ADMIN — SODIUM CHLORIDE 1 G: 900 INJECTION, SOLUTION INTRAVENOUS at 08:45

## 2018-06-20 RX ADMIN — OLANZAPINE 5 MG: 2.5 TABLET, FILM COATED ORAL at 21:01

## 2018-06-20 RX ADMIN — SODIUM CHLORIDE 150 ML/HR: 900 INJECTION, SOLUTION INTRAVENOUS at 00:26

## 2018-06-20 RX ADMIN — IPRATROPIUM BROMIDE AND ALBUTEROL SULFATE 3 ML: .5; 3 SOLUTION RESPIRATORY (INHALATION) at 16:46

## 2018-06-20 RX ADMIN — SODIUM CHLORIDE 150 ML/HR: 900 INJECTION, SOLUTION INTRAVENOUS at 08:45

## 2018-06-20 RX ADMIN — ENOXAPARIN SODIUM 40 MG: 40 INJECTION, SOLUTION INTRAVENOUS; SUBCUTANEOUS at 12:08

## 2018-06-20 RX ADMIN — IPRATROPIUM BROMIDE AND ALBUTEROL SULFATE 3 ML: .5; 3 SOLUTION RESPIRATORY (INHALATION) at 09:01

## 2018-06-20 RX ADMIN — Medication 1 AMPULE: at 09:10

## 2018-06-20 NOTE — PROGRESS NOTES
Pharmacokinetic Consult to Pharmacist    Timbo Darryl is a 78 y.o. male being treated for UTI with cefepime and tobramycin. Height: 5' 7\" (170.2 cm)  Weight: 65.3 kg (144 lb)  Lab Results   Component Value Date/Time    BUN 17 06/20/2018 05:07 AM    Creatinine 0.72 (L) 06/20/2018 05:07 AM    WBC 10.9 06/20/2018 05:07 AM    Procalcitonin <0.1 06/19/2018 08:32 PM    Lactic acid 1.8 04/05/2016 09:30 PM    Lactic acid 0.6 05/21/2012 03:48 PM    Lactic Acid (POC) 0.7 06/19/2018 08:35 PM      Estimated Creatinine Clearance: 81.9 mL/min (based on Cr of 0.72). CULTURES:  All Micro Results     Procedure Component Value Units Date/Time    CULTURE, BLOOD [[id number]] Collected:  06/19/18 2031    Order Status:  Completed Specimen:  Blood from Blood Updated:  06/20/18 1236     Special Requests: --        RIGHT  HAND       Culture result: NO GROWTH AFTER 15 HOURS       CULTURE, BLOOD [[id number]] Collected:  06/19/18 2052    Order Status:  Completed Specimen:  Blood from Blood Updated:  06/20/18 1236     Special Requests: --        LEFT  HAND       Culture result: NO GROWTH AFTER 15 HOURS       CULTURE, URINE [[id number]] Collected:  06/19/18 2328    Order Status:  Completed Specimen:  Urine from Castillo Specimen Updated:  06/20/18 1002        Lab Results   Component Value Date/Time    Tobramycin,random 4.7 06/20/2018 09:51 AM       Day 1 of tobramycin. Dosing per the [employer].    Tobramycin 325 mg IV X 1 given at 0024 on 6/20/18. A random level drawn at 0951 resulted at 4.7, so dose adjusted to 320 mg Q36H. Next level after the next scheduled dose. Given history of growing MRSA in wounds and growing enterococcus in urine, may be worthwhile to add gram positive coverage empirically if patient not improving on current therapy with tobramycin and cefepime. Will continue to follow patient.       Thank you,  Abdiel Mejía, PharmD  Clinical Pharmacist  [phone]

## 2018-06-20 NOTE — PROGRESS NOTES
Speech language pathology: bedside swallow note: Initial Assessment and Discharge    NAME/AGE/GENDER: Ledora Curling is a 78 y.o. male  DATE: 6/20/2018  PRIMARY DIAGNOSIS: UTI (urinary tract infection)  Sepsis ([facility] [address]  Hyponatremia       ICD-10: Treatment Diagnosis: R13.11 oral phase dysphagia  INTERDISCIPLINARY COLLABORATION: Registered Nurse  PRECAUTIONS/ALLERGIES: Bactrim [sulfamethoprim ds]; Benadryl [diphenhydramine hcl]; Ceftin [cefuroxime axetil]; Cefuroxime; Kayli Males; Levaquin [levofloxacin]; Phenergan [promethazine]; Pyridium [phenazopyridine]; and Kenn Hutch ASSESSMENT:Based on the objective data described below, Mr. Madeline Norris presents with no overt signs. sx of aspiration with thin liquids via cup and straw, pureed, mixed and solid. Recommend regular/thin. No further ST indicated at this time. · PO:  Regular  · Liquids:  regular thin  MEDICATIONS:  · With liquid  SUBJECTIVE:   alert  History of Present Injury/Illness: Mr. Madeline Norris  has a past medical history of Acute pain due to trauma; Anxiety; Bipolar 1 disorder ([address] Brief psychotic disorder; Chronic UTI ( ); Complicated UTI (urinary tract infection) ( ); Decubitus ulcer; Encephalopathy ( ); GERD (gastroesophageal reflux disease); Hereditary and idiopathic neuropathy; History of femur fracture; History of kidney stones; History of rectal sphincterotomy; urinary frequency; Hyperlipidemia; Hyperlipidemia; Hypertensive retinopathy; Hyponatremia; Kidney stone; Major depressive disorder; Muscle weakness; Neurogenic bladder, NOS; Obstructive and reflux uropathy; Paranoid schizophrenia ([address] Psychiatric disorder; Quadriplegia ([address] (at age 47- per nursing home records); Scrotal abscess; Sepsis ( ); Suprapubic catheter ([address] and Thyroid disease. He also has no past medical history of DEMENTIA; Endocrine disease; or Sleep disorder.   He also  has a past surgical history that includes hx urological; hx other surgical; and pr removal with insertion of suprapubic catheter. Present Symptoms:   Pain Intensity 1: 0  Pain Location 1: Shoulder  Pain Orientation 1: Left  Current Medications:   No current facility-administered medications on file prior to encounter. Current Outpatient Prescriptions on File Prior to Encounter   Medication Sig Dispense Refill    polyvinyl alcohol-povidon,PF, (REFRESH CLASSIC) 1.4-0.6 % ophthalmic solution Administer 1-2 Drops to both eyes as needed.  mineral oil (FLEET) enema Insert 118 mL into rectum now.  pva/gentian violet/methyl blue (HYDROFERA BLUE EX) by Apply Externally route. Indications: scrotal wound      propantheline (PROBANTHINE) 15 mg tablet Take 15 mg by mouth two (2) times a day.  naloxegol (MOVANTIK) 25 mg tab tablet Take 1 Tab by mouth Daily (before breakfast). 30 Tab 0    bisacodyl (DULCOLAX) 10 mg suppository Insert 10 mg into rectum every other day.  cyanocobalamin 1,000 mcg tablet Take 1,000 mcg by mouth every morning.  OLANZapine (ZYPREXA) 5 mg tablet Take 5 mg by mouth nightly.  polyethylene glycol (MIRALAX) 17 gram packet Take 17 g by mouth every morning.  SERTRALINE HCL (ZOLOFT PO) Take 50 mg by mouth every morning.  multivitamin (ONE A DAY) tablet Take 1 Tab by mouth every morning.  famotidine (PEPCID) 20 mg tablet Take 20 mg by mouth two (2) times a day.  sennosides 8.6 mg cap Take 4 Tabs by mouth nightly.  simethicone (MYLICON) 80 mg chewable tablet Take 80 mg by mouth two (2) times a day.  acetaminophen (TYLENOL) 500 mg tablet Take 650 mg by mouth every six (6) hours as needed for Pain.  albuterol (PROVENTIL HFA, VENTOLIN HFA, PROAIR HFA) 90 mcg/actuation inhaler Take 2 Puffs by inhalation four (4) times daily.        Current Dietary Status:  regular/thin    Social History/Home Situation:   Home Environment: Skilled nursing facility  One/Two Story Residence: One story  Living Alone: No  Support Systems: Skilled nursing facility, Friends \ neighbors  Patient Expects to be Discharged to[de-identified] Skilled nursing facility  Current DME Used/Available at Home: None  OBJECTIVE:   Respiratory Status:  Nasal cannula  2 l/min    Oral Motor Structure/Speech:  Oral-Motor Structure/Motor Speech  Labial: No impairment  Oral Hygiene: dry  Lingual: No impairment    Cognitive and Communication Status:  Neurologic State: Alert     Cognition: Follows commands  Perception: Appears intact  Perseveration: No perseveration noted  Safety/Judgement: Fall prevention    BEDSIDE SWALLOW EVALUATION  Oral Assessment:  Oral Assessment  Labial: No impairment  Oral Hygiene: dry  Lingual: No impairment  P.O. Trials:  Patient Position: upright in bed    The patient was given teaspoon amounts of the following:   Consistency Presented: Puree; Solid; Thin liquid;Mixed consistency  How Presented: Straw;Cup/sip;Spoon;Self-fed/presented;SLP-fed/presented    ORAL PHASE:  Bolus Acceptance: No impairment  Bolus Formation/Control: No impairment  Propulsion: No impairment     Oral Residue: None    PHARYNGEAL PHASE:  Initiation of Swallow: No impairment     Aspiration Signs/Symptoms: None  Vocal Quality: No impairment     Effective Modifications: None     Pharyngeal Phase Characteristics: No impairment, issues, or problems     OTHER OBSERVATIONS:  Rate/bite size: WNL   Endurance: WNL   Comments:      Tool Used: Dysphagia Outcome and Severity Scale (HEATHER)    Score Comments   Normal Diet  [] 7 With no strategies or extra time needed   Functional Swallow  [x] 6 May have mild oral or pharyngeal delay       Mild Dysphagia    [] 5 Which may require one diet consistency restricted (those who demonstrate penetration which is entirely cleared on MBS would be included)   Mild-Moderate Dysphagia  [] 4 With 1-2 diet consistencies restricted       Moderate Dysphagia  [] 3 With 2 or more diet consistencies restricted       Moderately Severe Dysphagia  [] 2 With partial PO strategies (trials with ST only)       Severe Dysphagia  [] 1 With inability to tolerate any PO safely          Score:  Initial: 6 Most Recent: X (Date: -- )   Interpretation of Tool: The Dysphagia Outcome and Severity Scale (EHATHER) is a simple, easy-to-use, 7-point scale developed to systematically rate the functional severity of dysphagia based on objective assessment and make recommendations for diet level, independence level, and type of nutrition. Score 7 6 5 4 3 2 1   Modifier CH CI CJ CK CL CM CN   ? Swallowing:     - CURRENT STATUS: CI - 1%-19% impaired, limited or restricted    - GOAL STATUS:  CI - 1%-19% impaired, limited or restricted    - D/C STATUS:  CI - 1%-19% impaired, limited or restricted  [place] / [place] / Product Type: Medicare /     TREATMENT:    (In addition to Assessment/Re-Assessment sessions the following treatments were rendered)  Assessment/Reassessment only, no treatment provided today  MODALITIES:         ORAL MOTOR  EXERCISES:        LARYNGEAL / PHARYNGEAL EXERCISES:        __________________________________________________________________________________________________  Safety:   After treatment position/precautions:  · Call light within reach  · RN notified  · Upright in Bed  No further ST indicated.    Time In: 1130  Time Out: [address] [place]/CCC/SLP

## 2018-06-20 NOTE — ED PROVIDER NOTES
HPI:  Patient has history of paraplegia,chronic indwelling suprapubic Acstillo with multiple prior UTI was sent in for shortness of breath and low blood pressure. Patient complaining of shortness of breath for the past 2 days with cough and for the past 2 weeks. Unsure about fever. Patient stated his blood pressure is normally in the low 90s over 50s  Denies chest pain. Not on oxygen. State he gets up and out of bed and get rotated around. Stated he has history of PE however we call the nursing home there was no diagnoses of DVT or PE. His cardiologist took him off of Eliquis recently  For \"unknown reason\"  hhe denies any abdominal pain however typically only has spasm when there is some sort of painful stimuli. Currently does not have any spasm. Denies any recent fall. No headaches, throat swelling sensation    ROS  Constitutional: No fever, no chills  Skin: no rash  Eye: No vision changes  ENMT: No sore throat  Respiratory: + shortness of breath, + cough  Cardiovascular: No chest pain  Gastrointestinal: No vomiting, no nausea, no diarrhea, no abdominal pain  :   MSK: No back pain, no muscle pain, no joint pain  Neuro: No headache, no change in mental status, no numbness, no tingling, no weakness    All other review of systems positive per history of present illness and the above otherwise negative or noncontributory.     Visit Vitals    BP (!) 89/52    Pulse 82    Temp 100 °F (37.8 °C)    Resp 21    Ht 5' 7\" (1.702 m)    Wt 65.3 kg (144 lb)    SpO2 90%    BMI 22.55 kg/m2     Past Medical History:   Diagnosis Date    Acute pain due to trauma     Anxiety     Bipolar 1 disorder (HCC)     unspecified    Brief psychotic disorder     Chronic UTI      Complicated UTI (urinary tract infection)      Decubitus ulcer     Encephalopathy      GERD (gastroesophageal reflux disease)     Hereditary and idiopathic neuropathy     History of femur fracture     History of kidney stones     History of rectal sphincterotomy     Hx of urinary frequency     Hyperlipidemia     Hyperlipidemia     Hypertensive retinopathy     Hyponatremia     Kidney stone     Major depressive disorder     Muscle weakness     Neurogenic bladder, NOS     Obstructive and reflux uropathy     [facility]oid schizophrenia ([facility]     Psychiatric disorder     PSDS with psychosis    Quadriplegia ([facility] at age 47- per nursing home records    C5-C7 per office note    Scrotal abscess     history of     Sepsis      Suprapubic catheter ([facility]     Thyroid disease      Past Surgical History:   Procedure Laterality Date    HX OTHER SURGICAL      Decubitus debridement    HX UROLOGICAL      Kidney Stone Extraction with Stent    VT REMOVAL WITH INSERTION OF SUPRAPUBIC CATHETER       Prior to Admission Medications   Prescriptions Last Dose Informant Patient Reported? Taking? DIVALPROEX SODIUM (DIVALPROEX PO)   Yes No   Sig: Take 250 mg by mouth nightly. [facility]-Me Blue-Sod Phos-PhSal-Hyo (URIBEL) 118-10-40.8-36 mg cap capsule   Yes No   Sig: Take 1 Cap by mouth two (2) times a day. OLANZapine (ZYPREXA) 5 mg tablet   Yes No   Sig: Take 5 mg by mouth nightly. SERTRALINE HCL (ZOLOFT PO)   Yes No   Sig: Take 50 mg by mouth every morning. acetaminophen (TYLENOL) 500 mg tablet   Yes No   Sig: Take 650 mg by mouth every six (6) hours as needed for Pain. albuterol (PROVENTIL HFA, VENTOLIN HFA, PROAIR HFA) 90 mcg/actuation inhaler   Yes No   Sig: Take  by inhalation as needed for Wheezing. albuterol-ipratropium (DUO-NEB) 2.5 mg-0.5 mg/3 ml nebu   Yes No   Sig: 3 mL by Nebulization route four (4) times daily. aspirin (ASPIRIN) 325 mg tablet   Yes No   Sig: Take 325 mg by mouth daily. Indications: a-fib   bisacodyl (DULCOLAX) 10 mg suppository   Yes No   Sig: Insert 10 mg into rectum every other day. cyanocobalamin 1,000 mcg tablet   Yes No   Sig: Take 1,000 mcg by mouth every morning.    famotidine (PEPCID) 20 mg tablet   Yes No   Sig: Take 20 mg by mouth two (2) times a day. methylphenidate HCl (RITALIN) 5 mg tablet   Yes No   Sig: Take 5 mg by mouth.   mineral oil (FLEET) enema   Yes No   Sig: Insert 118 mL into rectum now.   multivitamin (ONE A DAY) tablet   Yes No   Sig: Take 1 Tab by mouth every morning. naloxegol (MOVANTIK) 25 mg tab tablet   No No   Sig: Take 1 Tab by mouth Daily (before breakfast). oxybutynin chloride XL (DITROPAN XL) 15 mg CR tablet   Yes No   Sig: Take 15 mg by mouth every morning. polyethylene glycol (MIRALAX) 17 gram packet   Yes No   Sig: Take 17 g by mouth every morning. polyvinyl alcohol-povidon,PF, (REFRESH CLASSIC) 1.4-0.6 % ophthalmic solution   Yes No   Sig: Administer 1-2 Drops to both eyes as needed. propantheline (PROBANTHINE) 15 mg tablet   Yes No   Sig: Take 15 mg by mouth four (4) times daily.   pva/gentian violet/methyl blue (HYDROFERA BLUE EX)   Yes No   Sig: by Apply Externally route. Indications: scrotal wound   sennosides 8.6 mg cap   Yes No   Sig: Take 1 Tab by mouth two (2) times a day. simethicone (MYLICON) 80 mg chewable tablet   Yes No   Sig: Take 80 mg by mouth two (2) times a day. Facility-Administered Medications: None         Adult Exam   General: alert, no acute distress  Head: normocephalic, atraumatic  ENT: moist mucous membranes  Neck: supple, non-tender; full range of motion  Cardiovascular: regular rate and rhythm, normal peripheral perfusion, no edema  Respiratory: lungs are clear to auscultation; normal respirations; no wheezing, rales or rhonchi  Gastrointestinal: soft, non-tender; no rebound or guarding, no peritoneal signs, no distension  His suprapubic catheter has significant clots and sedimentation. There is a strong urinary odor. The Castillo was changed by myself and nursing staff at bedside. New urine is cloudy.   Concern for UTI  Back: non-tender, full range of motion  Musculoskeletal: normal range of motion, normal strength, no gross deformities  Neurological: normal speech. Awake, alert, oriented  Moving upper extremities. No facial droops, cranial nerve abnormality. Does not move her lower extremity. No signs of leg swelling or DVTs in the lower extremity  Psychiatric: cooperative; appropriate mood and affect    MDM: blood pressure fluctuated into the lower side he is a very difficult patient. Did not want us to get an IV because we missed initially. However informed the patient that unless he let me get IVs to initiate treatment that I have no way of helping him. He finally relented. We are able to get a left EJ. Fluid was started. Lactic acid less than 1. Mildly elevated leukocytosis compared to baseline. Pro-calcitonin is normal.  He is allergic to multiple 13 generation cephalosporin. We'll give tobramycin. IV fluid will be given. Patient will be admitted to the hospitalist service for further management of possible dehydration, chronic UTI. He is not tachycardic here. I have a lower suspicion for PE. I do not see signs of DVT. He is currently comfortable on oxygen and not complaining of shortness of breath or chest pain or pleuritic pain. Xr Chest Port    Result Date: 6/19/2018  History: Shortness of breath and cough Exam: portable chest Comparison: 5/13/2018 Findings: No change in the appearance of the mediastinal contour or osseous structures. No new alveolar infiltrate. The lung apices are clear.  Impressions: Stable portable chest     Recent Results (from the past 12 hour(s))   CBC WITH AUTOMATED DIFF    Collection Time: 06/19/18  8:32 PM   Result Value Ref Range    WBC 11.9 (H) 4.3 - 11.1 K/uL    RBC 3.73 (L) 4.23 - 5.67 M/uL    HGB 9.4 (L) 13.6 - 17.2 g/dL    HCT 30.0 (L) 41.1 - 50.3 %    MCV 80.4 79.6 - 97.8 FL    MCH 25.2 (L) 26.1 - 32.9 PG    MCHC 31.3 (L) 31.4 - 35.0 g/dL    RDW 16.6 (H) 11.9 - 14.6 %    PLATELET 556 256 - 242 K/uL    MPV 8.7 (L) 10.8 - 14.1 FL    DF AUTOMATED      NEUTROPHILS 85 (H) 43 - 78 %    LYMPHOCYTES 10 (L) 13 - 44 %    MONOCYTES 4 4.0 - 12.0 %    EOSINOPHILS 1 0.5 - 7.8 %    BASOPHILS 0 0.0 - 2.0 %    IMMATURE GRANULOCYTES 0 0.0 - 5.0 %    ABS. NEUTROPHILS 10.1 (H) 1.7 - 8.2 K/UL    ABS. LYMPHOCYTES 1.2 0.5 - 4.6 K/UL    ABS. MONOCYTES 0.5 0.1 - 1.3 K/UL    ABS. EOSINOPHILS 0.1 0.0 - 0.8 K/UL    ABS. BASOPHILS 0.0 0.0 - 0.2 K/UL    ABS. IMM. GRANS. 0.0 0.0 - 0.5 K/UL   METABOLIC PANEL, COMPREHENSIVE    Collection Time: 06/19/18  8:32 PM   Result Value Ref Range    Sodium 131 (L) 136 - 145 mmol/L    Potassium 5.0 3.5 - 5.1 mmol/L    Chloride 97 (L) 98 - 107 mmol/L    CO2 22 21 - 32 mmol/L    Anion gap 12 7 - 16 mmol/L    Glucose 86 65 - 100 mg/dL    BUN 18 8 - 23 MG/DL    Creatinine 0.73 (L) 0.8 - 1.5 MG/DL    GFR est AA >60 >60 ml/min/1.73m2    GFR est non-AA >60 >60 ml/min/1.73m2    Calcium 8.3 8.3 - 10.4 MG/DL    Bilirubin, total 0.5 0.2 - 1.1 MG/DL    ALT (SGPT) 10 (L) 12 - 65 U/L    AST (SGOT) 17 15 - 37 U/L    Alk. phosphatase 64 50 - 136 U/L    Protein, total 7.2 6.3 - 8.2 g/dL    Albumin 2.7 (L) 3.2 - 4.6 g/dL    Globulin 4.5 (H) 2.3 - 3.5 g/dL    A-G Ratio 0.6 (L) 1.2 - 3.5     PROCALCITONIN    Collection Time: 06/19/18  8:32 PM   Result Value Ref Range    Procalcitonin <0.1 ng/mL   POC TROPONIN-I    Collection Time: 06/19/18  8:34 PM   Result Value Ref Range    Troponin-I (POC) 0 (L) 0.02 - 0.05 ng/ml   POC LACTIC ACID    Collection Time: 06/19/18  8:35 PM   Result Value Ref Range    Lactic Acid (POC) 0.7 0.5 - 1.9 mmol/L   EKG, 12 LEAD, INITIAL    Collection Time: 06/19/18  8:42 PM   Result Value Ref Range    Ventricular Rate 83 BPM    Atrial Rate 326 BPM    QRS Duration 66 ms    Q-T Interval 354 ms    QTC Calculation (Bezet) 415 ms    Calculated P Axis 0 degrees    Calculated R Axis 69 degrees    Calculated T Axis 62 degrees    Diagnosis       !! AGE AND GENDER SPECIFIC ECG ANALYSIS !!   Atrial flutter with variable A-V block  Septal infarct , age undetermined  Abnormal ECG  When compared with ECG of 13-MAY-2018 14:26,  Atrial flutter has replaced Atrial fibrillation  Septal infarct is now Present             Dragon voice recognition software was used to create this note. Although the note has been reviewed and corrected where necessary, additional errors may have been overlooked and remain in the text.

## 2018-06-20 NOTE — PROGRESS NOTES
Interdisciplinary Rounds completed 06/20/18. Nursing, Case Management, Physician and PT present. Plan of care reviewed and updated.

## 2018-06-20 NOTE — PROGRESS NOTES
Sacral wound changed using wet to dry drsg. Sterile water used. Pt tolerated well.  Suprapubic cath drsg also changed using 4x4 and surgical tape

## 2018-06-20 NOTE — ED TRIAGE NOTES
EMS: From SNF. Around 4pm not feeling well. [place]. SOB. Was seen recently for rapid a fib. Meron Rasmussen put on meds and sent back to SNF. Around 7pm BP was 70/40. Meron Rasmussen EMS pressures 80/40. Hard stick, in line.  NSR on monitor in transport

## 2018-06-20 NOTE — ED NOTES
Assisted Dr. Leah Lo as he changed suprapubic catheter. Pt tolerated procedure well. Urine specimen obtained after catheter was changed.

## 2018-06-20 NOTE — PROGRESS NOTES
Pt A&O X4. Regular cardiac rhythm, S1 S2 heard. Lungs sound diminished bilaterally, pt on NC 3L. NS infusing at 150, VSS, MAP 84. Pt is a paraplegic. Chronic suprapubic catheter, sit c/d/i. Dual skin assessment completed with Dedrick Min RN. Open pressure ulcer in sacrum/coccyx, dressing intact with old drainage.

## 2018-06-20 NOTE — ED NOTES
Dr. Colin De Luna notified of patient blood pressure. Verbalized understanding. Will continue to monitor.

## 2018-06-20 NOTE — PROGRESS NOTES
's initial visit attempted. Mr. Kayleen Carty was lying in bed with a blanket over his head. It appeared he was trying to rest and I provided 's card to staff for sharing with patient. Chaplains remain available for support.     Micki Crandall 70  Board Certified

## 2018-06-20 NOTE — PROGRESS NOTES
Pharmacokinetic Consult to Pharmacist    Deanna Tristen is a 78 y.o. male being treated with vancomycin. Height: 5' 7\" (170.2 cm)  Weight: 65.3 kg (144 lb)  Lab Results   Component Value Date/Time    BUN 17 06/20/2018 05:07 AM    Creatinine 0.72 (L) 06/20/2018 05:07 AM    WBC 10.9 06/20/2018 05:07 AM    Procalcitonin <0.1 06/19/2018 08:32 PM    Lactic acid 1.8 04/05/2016 09:30 PM    Lactic acid 0.6 05/21/2012 03:48 PM    Lactic Acid (POC) 0.7 06/19/2018 08:35 PM      Estimated Creatinine Clearance: 81.9 mL/min (based on Cr of 0.72). CULTURES:  pending    Day 1 of vancomycin. Goal trough is 10-20. Vancomycin dose initiated at 1000 mg q 18 hours. Will continue to follow patient.       Thank you,  Jennifer Villanueva, PharmD  Clinical Pharmacist  [phone]

## 2018-06-20 NOTE — PROGRESS NOTES
Pharmacokinetic Consult to Pharmacist    Shobhanina Buck is a 78 y.o. male being treated for UTI with cefepime and tobramycin. Height: 5' 7\" (170.2 cm)  Weight: 65.3 kg (144 lb)  Lab Results   Component Value Date/Time    BUN 18 06/19/2018 08:32 PM    Creatinine 0.73 (L) 06/19/2018 08:32 PM    WBC 11.9 (H) 06/19/2018 08:32 PM    Procalcitonin <0.1 06/19/2018 08:32 PM    Lactic acid 1.8 04/05/2016 09:30 PM    Lactic acid 0.6 05/21/2012 03:48 PM    Lactic Acid (POC) 0.7 06/19/2018 08:35 PM      Estimated Creatinine Clearance: 80.8 mL/min (based on Cr of 0.73). CULTURES:  All Micro Results     Procedure Component Value Units Date/Time    CULTURE, BLOOD [[id number]] Collected:  06/19/18 2052    Order Status:  Completed Specimen:  Blood from Blood Updated:  06/19/18 2105    CULTURE, BLOOD [[id number]] Collected:  06/19/18 2031    Order Status:  Completed Specimen:  Blood from Blood Updated:  06/19/18 2105        Day 1 of tobramycin. Goal trough is < 1. Tobramycin dose initiated at 325 mg IV Q 24 hrs. Ten hour random level ordered and dosing frequency will be adjusted based on Easton Sully nomogram.    Will continue to follow patient.       Thank you,  Ara Mcwilliams, [facility]  Clinical Pharmacist

## 2018-06-20 NOTE — PROGRESS NOTES
Pt has been hypotensive throughout shift with MAP being less than 60. Increased IV fluid rate to 250 cc/hr with minimal improvement seen. HUBERT Cantu made aware of changes throughout shift. Instructed to to give 1 L NS IV bolus. Bolus given and fluid resuscitation with minimal improvement. Midodrine added to medication and given to pt. HUBERT Cantu informed of 1600 VS check. Verbalizes understanding of MAP being less than 60. NS currently running at 250 cc/hr and pt with asymptomatic hypotension with MAP 58. HUBERT Cantu also informed of updated med rec where pt is not taking ASA in facility and is taking metoprolol and methadone. HUBERT Cantu verbalizes understanding.

## 2018-06-20 NOTE — PROGRESS NOTES
CM attempted to meet with pt but he had the covers his head and would not engage. Pt came from [address] [place] [address]. Will be returning at MD. CM will follow.     Care Management Interventions  Transition of Care Consult (CM Consult): Discharge Planning  Current Support Network: [address] [place] [address]  Confirm Follow Up Transport: Family  Plan discussed with Pt/Family/Caregiver: Yes  Freedom of Choice Offered: Yes  Discharge Location  Discharge Placement: Skilled nursing facility

## 2018-06-20 NOTE — PROGRESS NOTES
TRANSFER - IN REPORT:    Verbal report received from Simeon on [employer]  being received from ED for routine progression of care      Report consisted of patients Situation, Background, Assessment and   Recommendations(SBAR). Information from the following report(s) SBAR, Kardex, ED Summary and MAR was reviewed with the receiving nurse. Opportunity for questions and clarification was provided. Assessment will be completed upon patients arrival to unit and care assumed.
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
## 2018-06-20 NOTE — H&P
Hospitalist H&P Note     Admit Date:  2018  8:07 PM   Name:  Meggan Luevano   Age:  78 y.o.  :  1940   MRN:  [id number]   PCP:  Not On File [facility]  Treatment Team: Attending Provider: Armin Garcia MD; Primary Nurse: Rizwana Price RN      CC: Fever , chills, sob, hypotensions, nausea and decreased appetite  HPI:   75yr old male with pmhx sig for paraplegia and recurrent uti' he lives at a [facility] facility. Was seen by his pcp's assistant yesterday fro uti and started on abx. He states he developed the cc after lunch and was sent to the er. He was hypotensive here but lactic acid within normal limits. ER doc exchanged suprapubic acth and has sent urine samples and culture from that - last  exchange was 5 weeks ago. Hospitalist asked to admit for further mgt. 10 systems reviewed and negative except as noted in HPI.   Past Medical History:   Diagnosis Date    Acute pain due to trauma     Anxiety     Bipolar 1 disorder (HCC)     unspecified    Brief psychotic disorder     Chronic UTI      Complicated UTI (urinary tract infection)      Decubitus ulcer     Encephalopathy      GERD (gastroesophageal reflux disease)     Hereditary and idiopathic neuropathy     History of femur fracture     History of kidney stones     History of rectal sphincterotomy     Hx of urinary frequency     Hyperlipidemia     Hyperlipidemia     Hypertensive retinopathy     Hyponatremia     Kidney stone     Major depressive disorder     Muscle weakness     Neurogenic bladder, NOS     Obstructive and reflux uropathy     Paranoid schizophrenia ([address]     Psychiatric disorder     PSDS with psychosis    Quadriplegia ([address] at age 47- per nursing home records    C5-C7 per office note    Scrotal abscess     history of     Sepsis      Suprapubic catheter ([address]     Thyroid disease       Past Surgical History:   Procedure Laterality Date    HX OTHER SURGICAL      Decubitus debridement    HX UROLOGICAL Kidney Stone Extraction with Stent    NJ REMOVAL WITH INSERTION OF SUPRAPUBIC CATHETER        Allergies   Allergen Reactions    Bactrim [Sulfamethoprim Ds] Shortness of Breath    Benadryl [Diphenhydramine Hcl] Unknown (comments)    Ceftin [Cefuroxime Axetil] Unknown (comments)    Cefuroxime Anxiety    Fortaz [Ceftazidime] Anxiety    Levaquin [Levofloxacin] Unknown (comments)    Phenergan [Promethazine] Unknown (comments)           Pyridium [Phenazopyridine] Nausea Only    Worthington Cole Unknown (comments)     Noted as quinalones      Social History   Substance Use Topics    Smoking status: Never Smoker    Smokeless tobacco: Never Used    Alcohol use No      Family History   Problem Relation Age of Onset    Family history unknown: Yes      Immunization History   Administered Date(s) Administered    TB Skin Test (PPD) Intradermal 12/28/2015     PTA Medications:  Prior to Admission Medications   Prescriptions Last Dose Informant Patient Reported? Taking? DIVALPROEX SODIUM (DIVALPROEX PO)   Yes No   Sig: Take 250 mg by mouth nightly. [facility] Blue-Sod Phos-PhSal-Hyo (URIBEL) 118-10-40.8-36 mg cap capsule   Yes No   Sig: Take 1 Cap by mouth two (2) times a day. OLANZapine (ZYPREXA) 5 mg tablet   Yes No   Sig: Take 5 mg by mouth nightly. SERTRALINE HCL (ZOLOFT PO)   Yes No   Sig: Take 50 mg by mouth every morning. acetaminophen (TYLENOL) 500 mg tablet   Yes No   Sig: Take 650 mg by mouth every six (6) hours as needed for Pain. albuterol (PROVENTIL HFA, VENTOLIN HFA, PROAIR HFA) 90 mcg/actuation inhaler   Yes No   Sig: Take  by inhalation as needed for Wheezing. albuterol-ipratropium (DUO-NEB) 2.5 mg-0.5 mg/3 ml nebu   Yes No   Sig: 3 mL by Nebulization route four (4) times daily. aspirin (ASPIRIN) 325 mg tablet   Yes No   Sig: Take 325 mg by mouth daily. Indications: a-fib   bisacodyl (DULCOLAX) 10 mg suppository   Yes No   Sig: Insert 10 mg into rectum every other day.    cyanocobalamin 1,000 mcg tablet   Yes No   Sig: Take 1,000 mcg by mouth every morning. famotidine (PEPCID) 20 mg tablet   Yes No   Sig: Take 20 mg by mouth two (2) times a day. methylphenidate HCl (RITALIN) 5 mg tablet   Yes No   Sig: Take 5 mg by mouth.   mineral oil (FLEET) enema   Yes No   Sig: Insert 118 mL into rectum now.   multivitamin (ONE A DAY) tablet   Yes No   Sig: Take 1 Tab by mouth every morning. naloxegol (MOVANTIK) 25 mg tab tablet   No No   Sig: Take 1 Tab by mouth Daily (before breakfast). oxybutynin chloride XL (DITROPAN XL) 15 mg CR tablet   Yes No   Sig: Take 15 mg by mouth every morning. polyethylene glycol (MIRALAX) 17 gram packet   Yes No   Sig: Take 17 g by mouth every morning. polyvinyl alcohol-povidon,PF, (REFRESH CLASSIC) 1.4-0.6 % ophthalmic solution   Yes No   Sig: Administer 1-2 Drops to both eyes as needed. propantheline (PROBANTHINE) 15 mg tablet   Yes No   Sig: Take 15 mg by mouth four (4) times daily.   pva/gentian violet/methyl blue (HYDROFERA BLUE EX)   Yes No   Sig: by Apply Externally route. Indications: scrotal wound   sennosides 8.6 mg cap   Yes No   Sig: Take 1 Tab by mouth two (2) times a day. simethicone (MYLICON) 80 mg chewable tablet   Yes No   Sig: Take 80 mg by mouth two (2) times a day.       Facility-Administered Medications: None       Objective:   Patient Vitals for the past 24 hrs:   Temp Pulse Resp BP SpO2   06/19/18 2320 - 86 10 (!) 83/45 97 %   06/19/18 2229 - 82 21 (!) 89/52 90 %   06/19/18 2225 - 85 19 (!) 88/49 93 %   06/19/18 2221 - 85 22 (!) 71/38 90 %   06/19/18 2150 - 77 26 97/50 97 %   06/19/18 2140 - 83 17 104/51 96 %   06/19/18 2130 - 82 25 99/55 96 %   06/19/18 2120 - 86 (!) 31 95/52 97 %   06/19/18 2100 - 77 17 (!) 87/55 97 %   06/19/18 2043 - 95 17 91/53 96 %   06/19/18 2013 100 °F (37.8 °C) 82 20 (!) 88/41 92 %     Oxygen Therapy  O2 Sat (%): 97 % (06/19/18 2320)  Pulse via Oximetry: 93 beats per minute (06/19/18 2320)  O2 Device: Room air (06/19/18 2013)  No intake or output data in the 24 hours ending 06/19/18 7151    Physical Exam:  General:    Well nourished. Alert. Paraplegic on 2l via nasal cannula  Eyes:   Normal sclera. Extraocular movements intact. ENT:  Normocephalic, atraumatic. Moist mucous membranes  CV:   RRR. No m/r/g. Peripheral pulses present. Capillary refill normal  Lungs:  CTAB. No wheezing, rhonchi, or rales. Abdomen: Soft, nontender, nondistended. Bowel sounds normal.   Extremities: Warm and dry. No cyanosis or edema. Neurologic: CN II-XII grossly intact. Sensation intact. Skin:     No rashes or jaundice. Normal coloration  Psych:  Normal mood and affect. I reviewed the labs, imaging, EKGs, telemetry, and other studies done this admission. Data Review:   Recent Results (from the past 24 hour(s))   CBC WITH AUTOMATED DIFF    Collection Time: 06/19/18  8:32 PM   Result Value Ref Range    WBC 11.9 (H) 4.3 - 11.1 K/uL    RBC 3.73 (L) 4.23 - 5.67 M/uL    HGB 9.4 (L) 13.6 - 17.2 g/dL    HCT 30.0 (L) 41.1 - 50.3 %    MCV 80.4 79.6 - 97.8 FL    MCH 25.2 (L) 26.1 - 32.9 PG    MCHC 31.3 (L) 31.4 - 35.0 g/dL    RDW 16.6 (H) 11.9 - 14.6 %    PLATELET 517 587 - 155 K/uL    MPV 8.7 (L) 10.8 - 14.1 FL    DF AUTOMATED      NEUTROPHILS 85 (H) 43 - 78 %    LYMPHOCYTES 10 (L) 13 - 44 %    MONOCYTES 4 4.0 - 12.0 %    EOSINOPHILS 1 0.5 - 7.8 %    BASOPHILS 0 0.0 - 2.0 %    IMMATURE GRANULOCYTES 0 0.0 - 5.0 %    ABS. NEUTROPHILS 10.1 (H) 1.7 - 8.2 K/UL    ABS. LYMPHOCYTES 1.2 0.5 - 4.6 K/UL    ABS. MONOCYTES 0.5 0.1 - 1.3 K/UL    ABS. EOSINOPHILS 0.1 0.0 - 0.8 K/UL    ABS. BASOPHILS 0.0 0.0 - 0.2 K/UL    ABS. IMM.  GRANS. 0.0 0.0 - 0.5 K/UL   METABOLIC PANEL, COMPREHENSIVE    Collection Time: 06/19/18  8:32 PM   Result Value Ref Range    Sodium 131 (L) 136 - 145 mmol/L    Potassium 5.0 3.5 - 5.1 mmol/L    Chloride 97 (L) 98 - 107 mmol/L    CO2 22 21 - 32 mmol/L    Anion gap 12 7 - 16 mmol/L    Glucose 86 65 - 100 mg/dL    BUN 18 8 - 23 MG/DL Creatinine 0.73 (L) 0.8 - 1.5 MG/DL    GFR est AA >60 >60 ml/min/1.73m2    GFR est non-AA >60 >60 ml/min/1.73m2    Calcium 8.3 8.3 - 10.4 MG/DL    Bilirubin, total 0.5 0.2 - 1.1 MG/DL    ALT (SGPT) 10 (L) 12 - 65 U/L    AST (SGOT) 17 15 - 37 U/L    Alk. phosphatase 64 50 - 136 U/L    Protein, total 7.2 6.3 - 8.2 g/dL    Albumin 2.7 (L) 3.2 - 4.6 g/dL    Globulin 4.5 (H) 2.3 - 3.5 g/dL    A-G Ratio 0.6 (L) 1.2 - 3.5     PROCALCITONIN    Collection Time: 06/19/18  8:32 PM   Result Value Ref Range    Procalcitonin <0.1 ng/mL   POC TROPONIN-I    Collection Time: 06/19/18  8:34 PM   Result Value Ref Range    Troponin-I (POC) 0 (L) 0.02 - 0.05 ng/ml   POC LACTIC ACID    Collection Time: 06/19/18  8:35 PM   Result Value Ref Range    Lactic Acid (POC) 0.7 0.5 - 1.9 mmol/L   EKG, 12 LEAD, INITIAL    Collection Time: 06/19/18  8:42 PM   Result Value Ref Range    Ventricular Rate 83 BPM    Atrial Rate 326 BPM    QRS Duration 66 ms    Q-T Interval 354 ms    QTC Calculation (Bezet) 415 ms    Calculated P Axis 0 degrees    Calculated R Axis 69 degrees    Calculated T Axis 62 degrees    Diagnosis       !! AGE AND GENDER SPECIFIC ECG ANALYSIS !! Atrial flutter with variable A-V block  Septal infarct , age undetermined  Abnormal ECG  When compared with ECG of 13-MAY-2018 14:26,  Atrial flutter has replaced Atrial fibrillation  Septal infarct is now Present         All Micro Results     Procedure Component Value Units Date/Time    CULTURE, BLOOD [[id number]] Collected:  06/19/18 2052    Order Status:  Completed Specimen:  Blood from Blood Updated:  06/19/18 2105    CULTURE, BLOOD [[id number]] Collected:  06/19/18 2031    Order Status:  Completed Specimen:  Blood from Blood Updated:  06/19/18 2105          Other Studies:  Xr Chest Port    Result Date: 6/19/2018  History: Shortness of breath and cough Exam: portable chest Comparison: 5/13/2018 Findings: No change in the appearance of the mediastinal contour or osseous structures.  No new alveolar infiltrate. The lung apices are clear.  Impressions: Stable portable chest       Assessment and Plan:     Hospital Problems as of 6/19/2018  Date Reviewed: 8/10/2016          Codes Class Noted - Resolved POA    Hyponatremia ICD-10-CM: E87.1  ICD-9-CM: 276.1  6/19/2018 - Present Yes        UTI (urinary tract infection) ICD-10-CM: N39.0  ICD-9-CM: 599.0  12/31/2015 - Present Yes        Paraplegia ([address] ICD-10-CM: G82.20  ICD-9-CM: 344.1  12/27/2015 - Present Yes              PLAN:  · Hypotension  fevers and chills- pt concerning for sepsis secondary to uti- will continue abx fro cath associa uti started in er and follow up bc  · Hyponatremia- ivfls  · Paraplegia- supportive care  · Continue relevant home meds  · Pt anxious to return back to his facility does not want to lose his bed- will consult case mgt    DVT ppx:  lovenox  Anticipated DC needs:  abx  Code status:  Full  Estimated LOS:  Greater than 2 midnights  Risk:  high    Signed:  Mark Austin MD

## 2018-06-20 NOTE — ED NOTES
Unsuccessful IV attempt by RNx3 and unsuccessful IV attemptx2 by Tech. MD notified of difficulty obtaining usable IV access.

## 2018-06-20 NOTE — ED NOTES
TRANSFER - OUT REPORT:    Verbal report given to TARA Bateman(name) on Sabina Zechariah  being transferred to  [facility](unit) for routine progression of care       Report consisted of patients Situation, Background, Assessment and   Recommendations(SBAR). Information from the following report(s) SBAR and ED Summary was reviewed with the receiving nurse. Lines:       Opportunity for questions and clarification was provided.       Patient transported with:   O2 @ 2 liters

## 2018-06-21 ENCOUNTER — APPOINTMENT (OUTPATIENT)
Dept: GENERAL RADIOLOGY | Age: 78
DRG: 698 | End: 2018-06-21
Attending: EMERGENCY MEDICINE
Payer: MEDICARE

## 2018-06-21 ENCOUNTER — HOSPITAL ENCOUNTER (EMERGENCY)
Age: 78
Discharge: NURSING, REHAB OR LONG-TERM CARE | DRG: 698 | End: 2018-06-21
Attending: EMERGENCY MEDICINE
Payer: MEDICARE

## 2018-06-21 VITALS
RESPIRATION RATE: 18 BRPM | BODY MASS INDEX: 23.07 KG/M2 | HEIGHT: 67 IN | DIASTOLIC BLOOD PRESSURE: 63 MMHG | TEMPERATURE: 98.5 F | OXYGEN SATURATION: 96 % | SYSTOLIC BLOOD PRESSURE: 138 MMHG | HEART RATE: 93 BPM | WEIGHT: 147 LBS

## 2018-06-21 VITALS
WEIGHT: 149.3 LBS | BODY MASS INDEX: 23.43 KG/M2 | TEMPERATURE: 99.1 F | SYSTOLIC BLOOD PRESSURE: 110 MMHG | DIASTOLIC BLOOD PRESSURE: 57 MMHG | RESPIRATION RATE: 18 BRPM | OXYGEN SATURATION: 96 % | HEART RATE: 90 BPM | HEIGHT: 67 IN

## 2018-06-21 DIAGNOSIS — R05.9 COUGH: Primary | ICD-10-CM

## 2018-06-21 LAB
ALBUMIN SERPL-MCNC: 2.3 G/DL (ref 3.2–4.6)
ALBUMIN/GLOB SERPL: 0.6 {RATIO} (ref 1.2–3.5)
ALP SERPL-CCNC: 65 U/L (ref 50–136)
ALT SERPL-CCNC: 12 U/L (ref 12–65)
ANION GAP SERPL CALC-SCNC: 8 MMOL/L (ref 7–16)
ANION GAP SERPL CALC-SCNC: 9 MMOL/L (ref 7–16)
AST SERPL-CCNC: 14 U/L (ref 15–37)
BASOPHILS # BLD: 0 K/UL (ref 0–0.2)
BASOPHILS # BLD: 0 K/UL (ref 0–0.2)
BASOPHILS NFR BLD: 0 % (ref 0–2)
BASOPHILS NFR BLD: 0 % (ref 0–2)
BILIRUB SERPL-MCNC: 0.3 MG/DL (ref 0.2–1.1)
BUN SERPL-MCNC: 10 MG/DL (ref 8–23)
BUN SERPL-MCNC: 12 MG/DL (ref 8–23)
CALCIUM SERPL-MCNC: 7.2 MG/DL (ref 8.3–10.4)
CALCIUM SERPL-MCNC: 7.7 MG/DL (ref 8.3–10.4)
CHLORIDE SERPL-SCNC: 107 MMOL/L (ref 98–107)
CHLORIDE SERPL-SCNC: 108 MMOL/L (ref 98–107)
CO2 SERPL-SCNC: 21 MMOL/L (ref 21–32)
CO2 SERPL-SCNC: 22 MMOL/L (ref 21–32)
CREAT SERPL-MCNC: 0.49 MG/DL (ref 0.8–1.5)
CREAT SERPL-MCNC: 0.58 MG/DL (ref 0.8–1.5)
DIFFERENTIAL METHOD BLD: ABNORMAL
DIFFERENTIAL METHOD BLD: ABNORMAL
EOSINOPHIL # BLD: 0.1 K/UL (ref 0–0.8)
EOSINOPHIL # BLD: 0.1 K/UL (ref 0–0.8)
EOSINOPHIL NFR BLD: 2 % (ref 0.5–7.8)
EOSINOPHIL NFR BLD: 2 % (ref 0.5–7.8)
ERYTHROCYTE [DISTWIDTH] IN BLOOD BY AUTOMATED COUNT: 16.8 % (ref 11.9–14.6)
ERYTHROCYTE [DISTWIDTH] IN BLOOD BY AUTOMATED COUNT: 16.9 % (ref 11.9–14.6)
GLOBULIN SER CALC-MCNC: 4 G/DL (ref 2.3–3.5)
GLUCOSE SERPL-MCNC: 111 MG/DL (ref 65–100)
GLUCOSE SERPL-MCNC: 97 MG/DL (ref 65–100)
HCT VFR BLD AUTO: 27.5 % (ref 41.1–50.3)
HCT VFR BLD AUTO: 29.3 % (ref 41.1–50.3)
HGB BLD-MCNC: 8.1 G/DL (ref 13.6–17.2)
HGB BLD-MCNC: 8.9 G/DL (ref 13.6–17.2)
IMM GRANULOCYTES # BLD: 0 K/UL (ref 0–0.5)
IMM GRANULOCYTES # BLD: 0 K/UL (ref 0–0.5)
IMM GRANULOCYTES NFR BLD AUTO: 0 % (ref 0–5)
IMM GRANULOCYTES NFR BLD AUTO: 1 % (ref 0–5)
LYMPHOCYTES # BLD: 1.2 K/UL (ref 0.5–4.6)
LYMPHOCYTES # BLD: 1.4 K/UL (ref 0.5–4.6)
LYMPHOCYTES NFR BLD: 22 % (ref 13–44)
LYMPHOCYTES NFR BLD: 22 % (ref 13–44)
MCH RBC QN AUTO: 24.7 PG (ref 26.1–32.9)
MCH RBC QN AUTO: 24.9 PG (ref 26.1–32.9)
MCHC RBC AUTO-ENTMCNC: 29.5 G/DL (ref 31.4–35)
MCHC RBC AUTO-ENTMCNC: 30.4 G/DL (ref 31.4–35)
MCV RBC AUTO: 82.1 FL (ref 79.6–97.8)
MCV RBC AUTO: 83.8 FL (ref 79.6–97.8)
MONOCYTES # BLD: 0.4 K/UL (ref 0.1–1.3)
MONOCYTES # BLD: 0.6 K/UL (ref 0.1–1.3)
MONOCYTES NFR BLD: 10 % (ref 4–12)
MONOCYTES NFR BLD: 6 % (ref 4–12)
NEUTS SEG # BLD: 3.8 K/UL (ref 1.7–8.2)
NEUTS SEG # BLD: 4 K/UL (ref 1.7–8.2)
NEUTS SEG NFR BLD: 65 % (ref 43–78)
NEUTS SEG NFR BLD: 70 % (ref 43–78)
PLATELET # BLD AUTO: 197 K/UL (ref 150–450)
PLATELET # BLD AUTO: 212 K/UL (ref 150–450)
PMV BLD AUTO: 8.5 FL (ref 10.8–14.1)
PMV BLD AUTO: 9.1 FL (ref 10.8–14.1)
POTASSIUM SERPL-SCNC: 4.4 MMOL/L (ref 3.5–5.1)
POTASSIUM SERPL-SCNC: 4.4 MMOL/L (ref 3.5–5.1)
PROT SERPL-MCNC: 6.3 G/DL (ref 6.3–8.2)
RBC # BLD AUTO: 3.28 M/UL (ref 4.23–5.67)
RBC # BLD AUTO: 3.57 M/UL (ref 4.23–5.67)
SODIUM SERPL-SCNC: 137 MMOL/L (ref 136–145)
SODIUM SERPL-SCNC: 138 MMOL/L (ref 136–145)
WBC # BLD AUTO: 5.6 K/UL (ref 4.3–11.1)
WBC # BLD AUTO: 6.1 K/UL (ref 4.3–11.1)

## 2018-06-21 PROCEDURE — 74011250637 HC RX REV CODE- 250/637: Performed by: INTERNAL MEDICINE

## 2018-06-21 PROCEDURE — 80053 COMPREHEN METABOLIC PANEL: CPT | Performed by: EMERGENCY MEDICINE

## 2018-06-21 PROCEDURE — 71046 X-RAY EXAM CHEST 2 VIEWS: CPT

## 2018-06-21 PROCEDURE — 36415 COLL VENOUS BLD VENIPUNCTURE: CPT | Performed by: NURSE PRACTITIONER

## 2018-06-21 PROCEDURE — 94640 AIRWAY INHALATION TREATMENT: CPT

## 2018-06-21 PROCEDURE — 80048 BASIC METABOLIC PNL TOTAL CA: CPT | Performed by: NURSE PRACTITIONER

## 2018-06-21 PROCEDURE — 77030027138 HC INCENT SPIROMETER -A

## 2018-06-21 PROCEDURE — 99284 EMERGENCY DEPT VISIT MOD MDM: CPT | Performed by: EMERGENCY MEDICINE

## 2018-06-21 PROCEDURE — 74011250637 HC RX REV CODE- 250/637: Performed by: NURSE PRACTITIONER

## 2018-06-21 PROCEDURE — 74011000250 HC RX REV CODE- 250: Performed by: INTERNAL MEDICINE

## 2018-06-21 PROCEDURE — 77030020263 HC SOL INJ SOD CL0.9% LFCR 1000ML

## 2018-06-21 PROCEDURE — 74011250636 HC RX REV CODE- 250/636: Performed by: INTERNAL MEDICINE

## 2018-06-21 PROCEDURE — 74011000258 HC RX REV CODE- 258: Performed by: INTERNAL MEDICINE

## 2018-06-21 PROCEDURE — 85025 COMPLETE CBC W/AUTO DIFF WBC: CPT | Performed by: NURSE PRACTITIONER

## 2018-06-21 PROCEDURE — 94760 N-INVAS EAR/PLS OXIMETRY 1: CPT

## 2018-06-21 RX ORDER — ASPIRIN 325 MG
325 TABLET ORAL DAILY
Qty: 30 TAB | Refills: 0 | Status: SHIPPED | OUTPATIENT
Start: 2018-06-22 | End: 2019-08-30

## 2018-06-21 RX ORDER — SODIUM CHLORIDE 0.9 % (FLUSH) 0.9 %
5-10 SYRINGE (ML) INJECTION AS NEEDED
Status: DISCONTINUED | OUTPATIENT
Start: 2018-06-21 | End: 2018-06-22 | Stop reason: HOSPADM

## 2018-06-21 RX ORDER — MIDODRINE HYDROCHLORIDE 5 MG/1
5 TABLET ORAL
Qty: 30 TAB | Refills: 0 | Status: SHIPPED | OUTPATIENT
Start: 2018-06-21 | End: 2018-07-21

## 2018-06-21 RX ORDER — SODIUM CHLORIDE 0.9 % (FLUSH) 0.9 %
5-10 SYRINGE (ML) INJECTION EVERY 8 HOURS
Status: DISCONTINUED | OUTPATIENT
Start: 2018-06-21 | End: 2018-06-22 | Stop reason: HOSPADM

## 2018-06-21 RX ORDER — CEFPODOXIME PROXETIL 100 MG/1
100 TABLET, FILM COATED ORAL 2 TIMES DAILY
Qty: 10 TAB | Refills: 0 | Status: SHIPPED | OUTPATIENT
Start: 2018-06-21 | End: 2018-06-26

## 2018-06-21 RX ADMIN — SERTRALINE HYDROCHLORIDE 50 MG: 50 TABLET ORAL at 09:44

## 2018-06-21 RX ADMIN — TOBRAMYCIN 320 MG: 40 INJECTION, SOLUTION INTRAMUSCULAR; INTRAVENOUS at 12:50

## 2018-06-21 RX ADMIN — POLYETHYLENE GLYCOL (3350) 17 G: 17 POWDER, FOR SOLUTION ORAL at 09:49

## 2018-06-21 RX ADMIN — IPRATROPIUM BROMIDE AND ALBUTEROL SULFATE 3 ML: .5; 3 SOLUTION RESPIRATORY (INHALATION) at 06:39

## 2018-06-21 RX ADMIN — CEFEPIME HYDROCHLORIDE 2 G: 2 INJECTION, POWDER, FOR SOLUTION INTRAVENOUS at 04:10

## 2018-06-21 RX ADMIN — MIDODRINE HYDROCHLORIDE 5 MG: 5 TABLET ORAL at 12:49

## 2018-06-21 RX ADMIN — SODIUM CHLORIDE 150 ML/HR: 900 INJECTION, SOLUTION INTRAVENOUS at 05:36

## 2018-06-21 RX ADMIN — VANCOMYCIN HYDROCHLORIDE 1000 MG: 1 INJECTION, POWDER, LYOPHILIZED, FOR SOLUTION INTRAVENOUS at 13:37

## 2018-06-21 RX ADMIN — MIDODRINE HYDROCHLORIDE 5 MG: 5 TABLET ORAL at 09:45

## 2018-06-21 RX ADMIN — CEFEPIME HYDROCHLORIDE 2 G: 2 INJECTION, POWDER, FOR SOLUTION INTRAVENOUS at 09:43

## 2018-06-21 RX ADMIN — IPRATROPIUM BROMIDE AND ALBUTEROL SULFATE 3 ML: .5; 3 SOLUTION RESPIRATORY (INHALATION) at 15:13

## 2018-06-21 RX ADMIN — Medication 1 AMPULE: at 09:58

## 2018-06-21 RX ADMIN — IPRATROPIUM BROMIDE AND ALBUTEROL SULFATE 3 ML: .5; 3 SOLUTION RESPIRATORY (INHALATION) at 11:08

## 2018-06-21 RX ADMIN — OXYBUTYNIN CHLORIDE 15 MG: 5 TABLET, EXTENDED RELEASE ORAL at 09:44

## 2018-06-21 RX ADMIN — ENOXAPARIN SODIUM 40 MG: 40 INJECTION, SOLUTION INTRAVENOUS; SUBCUTANEOUS at 12:49

## 2018-06-21 NOTE — PROGRESS NOTES
Order received and chart reviewed. Met with patient to discuss current functional level with his long term quadriplegia status. He reports he is a emerson lift dependent transfer in/out of his manual w/c. He is total assist for his bed mobility, transfers and is non-ambulatory. He reports he can do his manual pressure reliefs when in the w/c. At this time PT has no skilled treatment to offer that would impact a change in his functional status. PT is discontinuing service at this time.

## 2018-06-21 NOTE — PROGRESS NOTES
Rounded every hour and prn. Alert and pleasant. Denied pain, n/v. BP increased. PIVs removed and clean, dry dressing placed. PIV in Right hand was d/c'd with catheter in tact and gauze was reinforced.

## 2018-06-21 NOTE — PROGRESS NOTES
Pt to DC back to [address] [facility]  Report number given to nurse. [phone]  Leane Even to transport-4pm.  CM attempted to reach out to -no answer. Pt notified of transport time.

## 2018-06-21 NOTE — PROGRESS NOTES
TRANSFER - OUT REPORT:    Verbal report given to [facility] at [facility] @ [phone] on Dennie Laos  being transferred to [facility]  for routine progression of care       Report consisted of patients Situation, Background, Assessment and   Recommendations(SBAR). Information from the following report(s) SBAR was reviewed with the receiving nurse. Opportunity for questions and clarification was provided.       Patient transported with:  Faraz George
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
## 2018-06-21 NOTE — PROGRESS NOTES
Pt coughed up blood tinged mucus and c/o SOB at 0630. Head of bed elevated and pt comfortable at this time.

## 2018-06-22 NOTE — ED TRIAGE NOTES
Discharged from 6 th floor today to [facility].   Has been coughing up blood x 1 month and feels it has gotten worse today   Also having some abdominal distention that was treated with an enema prior to arrival.
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
## 2018-06-22 NOTE — ED NOTES
I have reviewed discharge instructions with the patient. The patient verbalized understanding. Patient left ED via Discharge Method: stretcher to SNF with  transport. Opportunity for questions and clarification provided. Patient given 0 scripts. To continue your aftercare when you leave the hospital, you may receive an automated call from our care team to check in on how you are doing. This is a free service and part of our promise to provide the best care and service to meet your aftercare needs.  If you have questions, or wish to unsubscribe from this service please call [phone]. Thank you for Choosing our [facility].

## 2018-06-23 LAB
BACTERIA SPEC CULT: ABNORMAL
SERVICE CMNT-IMP: ABNORMAL

## 2018-06-24 LAB
BACTERIA SPEC CULT: NORMAL
SERVICE CMNT-IMP: NORMAL

## 2018-07-06 LAB
BACTERIA SPEC CULT: ABNORMAL
GRAM STN SPEC: ABNORMAL
SERVICE CMNT-IMP: ABNORMAL

## 2018-07-09 LAB
Lab: NORMAL
REFERENCE LAB,REFLB: NORMAL
TEST DESCRIPTION:,ATST: NORMAL

## 2019-08-30 ENCOUNTER — APPOINTMENT (OUTPATIENT)
Dept: CT IMAGING | Age: 79
DRG: 291 | End: 2019-08-30
Attending: EMERGENCY MEDICINE
Payer: MEDICARE

## 2019-08-30 ENCOUNTER — APPOINTMENT (OUTPATIENT)
Dept: GENERAL RADIOLOGY | Age: 79
DRG: 291 | End: 2019-08-30
Attending: EMERGENCY MEDICINE
Payer: MEDICARE

## 2019-08-30 ENCOUNTER — HOSPITAL ENCOUNTER (INPATIENT)
Age: 79
LOS: 6 days | Discharge: NURSING, REHAB OR LONG-TERM CARE | DRG: 291 | End: 2019-09-05
Attending: EMERGENCY MEDICINE | Admitting: INTERNAL MEDICINE
Payer: MEDICARE

## 2019-08-30 DIAGNOSIS — S30.1XXA ABDOMINAL WALL HEMATOMA, INITIAL ENCOUNTER: ICD-10-CM

## 2019-08-30 DIAGNOSIS — J90 PLEURAL EFFUSION: Primary | ICD-10-CM

## 2019-08-30 DIAGNOSIS — R09.02 HYPOXIA: ICD-10-CM

## 2019-08-30 DIAGNOSIS — I50.9 CONGESTIVE HEART FAILURE, UNSPECIFIED HF CHRONICITY, UNSPECIFIED HEART FAILURE TYPE (HCC): ICD-10-CM

## 2019-08-30 PROBLEM — I50.33 ACUTE ON CHRONIC CONGESTIVE HEART FAILURE WITH LEFT VENTRICULAR DIASTOLIC DYSFUNCTION (HCC): Chronic | Status: ACTIVE | Noted: 2019-08-30

## 2019-08-30 PROBLEM — J96.00 ACUTE RESPIRATORY FAILURE (HCC): Status: ACTIVE | Noted: 2019-08-30

## 2019-08-30 LAB
ALBUMIN SERPL-MCNC: 2 G/DL (ref 3.2–4.6)
ALBUMIN/GLOB SERPL: 0.4 {RATIO} (ref 1.2–3.5)
ALP SERPL-CCNC: 88 U/L (ref 50–136)
ALT SERPL-CCNC: 15 U/L (ref 12–65)
ANION GAP SERPL CALC-SCNC: 5 MMOL/L (ref 7–16)
AST SERPL-CCNC: 27 U/L (ref 15–37)
ATRIAL RATE: 75 BPM
BACTERIA URNS QL MICRO: ABNORMAL /HPF
BASOPHILS # BLD: 0 K/UL (ref 0–0.2)
BASOPHILS NFR BLD: 0 % (ref 0–2)
BILIRUB SERPL-MCNC: 0.5 MG/DL (ref 0.2–1.1)
BNP SERPL-MCNC: 434 PG/ML
BUN SERPL-MCNC: 8 MG/DL (ref 8–23)
CALCIUM SERPL-MCNC: 7.4 MG/DL (ref 8.3–10.4)
CALCULATED R AXIS, ECG10: 25 DEGREES
CALCULATED T AXIS, ECG11: 41 DEGREES
CASTS URNS QL MICRO: 0 /LPF
CHLORIDE SERPL-SCNC: 92 MMOL/L (ref 98–107)
CO2 SERPL-SCNC: 29 MMOL/L (ref 21–32)
CREAT SERPL-MCNC: 0.39 MG/DL (ref 0.8–1.5)
CRYSTALS URNS QL MICRO: 0 /LPF
DIAGNOSIS, 93000: NORMAL
DIFFERENTIAL METHOD BLD: ABNORMAL
EOSINOPHIL # BLD: 0.2 K/UL (ref 0–0.8)
EOSINOPHIL NFR BLD: 3 % (ref 0.5–7.8)
EPI CELLS #/AREA URNS HPF: ABNORMAL /HPF
ERYTHROCYTE [DISTWIDTH] IN BLOOD BY AUTOMATED COUNT: 19.9 % (ref 11.9–14.6)
GLOBULIN SER CALC-MCNC: 4.5 G/DL (ref 2.3–3.5)
GLUCOSE SERPL-MCNC: 130 MG/DL (ref 65–100)
HCT VFR BLD AUTO: 26.7 % (ref 41.1–50.3)
HGB BLD-MCNC: 8.2 G/DL (ref 13.6–17.2)
IMM GRANULOCYTES # BLD AUTO: 0 K/UL (ref 0–0.5)
IMM GRANULOCYTES NFR BLD AUTO: 1 % (ref 0–5)
INR PPP: 1
LACTATE BLD-SCNC: 0.88 MMOL/L (ref 0.5–1.9)
LYMPHOCYTES # BLD: 1.3 K/UL (ref 0.5–4.6)
LYMPHOCYTES NFR BLD: 22 % (ref 13–44)
MCH RBC QN AUTO: 28 PG (ref 26.1–32.9)
MCHC RBC AUTO-ENTMCNC: 30.7 G/DL (ref 31.4–35)
MCV RBC AUTO: 91.1 FL (ref 79.6–97.8)
MONOCYTES # BLD: 0.6 K/UL (ref 0.1–1.3)
MONOCYTES NFR BLD: 9 % (ref 4–12)
MUCOUS THREADS URNS QL MICRO: 0 /LPF
NEUTS SEG # BLD: 3.9 K/UL (ref 1.7–8.2)
NEUTS SEG NFR BLD: 65 % (ref 43–78)
NRBC # BLD: 0 K/UL (ref 0–0.2)
OSMOLALITY SERPL: 265 MOSM/KG H2O (ref 280–301)
PLATELET # BLD AUTO: 178 K/UL (ref 150–450)
PMV BLD AUTO: 9.4 FL (ref 9.4–12.3)
POTASSIUM SERPL-SCNC: 4.4 MMOL/L (ref 3.5–5.1)
PROT SERPL-MCNC: 6.5 G/DL (ref 6.3–8.2)
PROTHROMBIN TIME: 13.9 SEC (ref 11.7–14.5)
Q-T INTERVAL, ECG07: 416 MS
QRS DURATION, ECG06: 70 MS
QTC CALCULATION (BEZET), ECG08: 455 MS
RBC # BLD AUTO: 2.93 M/UL (ref 4.23–5.6)
RBC #/AREA URNS HPF: ABNORMAL /HPF
SODIUM SERPL-SCNC: 126 MMOL/L (ref 136–145)
TROPONIN I SERPL-MCNC: <0.02 NG/ML (ref 0.02–0.05)
VENTRICULAR RATE, ECG03: 72 BPM
WBC # BLD AUTO: 6 K/UL (ref 4.3–11.1)
WBC URNS QL MICRO: >100 /HPF

## 2019-08-30 PROCEDURE — 74011000258 HC RX REV CODE- 258: Performed by: INTERNAL MEDICINE

## 2019-08-30 PROCEDURE — 74011250636 HC RX REV CODE- 250/636: Performed by: EMERGENCY MEDICINE

## 2019-08-30 PROCEDURE — 93005 ELECTROCARDIOGRAM TRACING: CPT | Performed by: EMERGENCY MEDICINE

## 2019-08-30 PROCEDURE — 83605 ASSAY OF LACTIC ACID: CPT

## 2019-08-30 PROCEDURE — 87088 URINE BACTERIA CULTURE: CPT

## 2019-08-30 PROCEDURE — 74011250637 HC RX REV CODE- 250/637: Performed by: NURSE PRACTITIONER

## 2019-08-30 PROCEDURE — 74011250636 HC RX REV CODE- 250/636: Performed by: INTERNAL MEDICINE

## 2019-08-30 PROCEDURE — 74176 CT ABD & PELVIS W/O CONTRAST: CPT

## 2019-08-30 PROCEDURE — 83880 ASSAY OF NATRIURETIC PEPTIDE: CPT

## 2019-08-30 PROCEDURE — 96374 THER/PROPH/DIAG INJ IV PUSH: CPT | Performed by: EMERGENCY MEDICINE

## 2019-08-30 PROCEDURE — 99285 EMERGENCY DEPT VISIT HI MDM: CPT | Performed by: EMERGENCY MEDICINE

## 2019-08-30 PROCEDURE — 74011000250 HC RX REV CODE- 250: Performed by: NURSE PRACTITIONER

## 2019-08-30 PROCEDURE — 65270000029 HC RM PRIVATE

## 2019-08-30 PROCEDURE — 74011250636 HC RX REV CODE- 250/636: Performed by: NURSE PRACTITIONER

## 2019-08-30 PROCEDURE — 81003 URINALYSIS AUTO W/O SCOPE: CPT | Performed by: EMERGENCY MEDICINE

## 2019-08-30 PROCEDURE — 85610 PROTHROMBIN TIME: CPT

## 2019-08-30 PROCEDURE — 94640 AIRWAY INHALATION TREATMENT: CPT

## 2019-08-30 PROCEDURE — 87186 SC STD MICRODIL/AGAR DIL: CPT

## 2019-08-30 PROCEDURE — 81015 MICROSCOPIC EXAM OF URINE: CPT

## 2019-08-30 PROCEDURE — 85025 COMPLETE CBC W/AUTO DIFF WBC: CPT

## 2019-08-30 PROCEDURE — 77010033678 HC OXYGEN DAILY

## 2019-08-30 PROCEDURE — 84484 ASSAY OF TROPONIN QUANT: CPT

## 2019-08-30 PROCEDURE — 87086 URINE CULTURE/COLONY COUNT: CPT

## 2019-08-30 PROCEDURE — 65660000000 HC RM CCU STEPDOWN

## 2019-08-30 PROCEDURE — 94760 N-INVAS EAR/PLS OXIMETRY 1: CPT

## 2019-08-30 PROCEDURE — 74011636320 HC RX REV CODE- 636/320: Performed by: EMERGENCY MEDICINE

## 2019-08-30 PROCEDURE — 71045 X-RAY EXAM CHEST 1 VIEW: CPT

## 2019-08-30 PROCEDURE — 83930 ASSAY OF BLOOD OSMOLALITY: CPT

## 2019-08-30 PROCEDURE — 74011000258 HC RX REV CODE- 258: Performed by: EMERGENCY MEDICINE

## 2019-08-30 PROCEDURE — 80053 COMPREHEN METABOLIC PANEL: CPT

## 2019-08-30 PROCEDURE — 36415 COLL VENOUS BLD VENIPUNCTURE: CPT

## 2019-08-30 RX ORDER — METHADONE HYDROCHLORIDE 5 MG/1
5 TABLET ORAL EVERY 8 HOURS
Status: DISCONTINUED | OUTPATIENT
Start: 2019-08-30 | End: 2019-08-30

## 2019-08-30 RX ORDER — SERTRALINE HYDROCHLORIDE 50 MG/1
50 TABLET, FILM COATED ORAL DAILY
Status: DISCONTINUED | OUTPATIENT
Start: 2019-08-31 | End: 2019-09-05 | Stop reason: HOSPADM

## 2019-08-30 RX ORDER — HYDROCODONE BITARTRATE AND ACETAMINOPHEN 5; 325 MG/1; MG/1
1 TABLET ORAL
Status: DISCONTINUED | OUTPATIENT
Start: 2019-08-30 | End: 2019-09-05 | Stop reason: HOSPADM

## 2019-08-30 RX ORDER — POLYETHYLENE GLYCOL 3350 17 G/17G
17 POWDER, FOR SOLUTION ORAL
Status: DISCONTINUED | OUTPATIENT
Start: 2019-08-31 | End: 2019-09-05 | Stop reason: HOSPADM

## 2019-08-30 RX ORDER — OLANZAPINE 2.5 MG/1
2.5 TABLET ORAL EVERY EVENING
Status: DISCONTINUED | OUTPATIENT
Start: 2019-08-31 | End: 2019-08-30

## 2019-08-30 RX ORDER — ENOXAPARIN SODIUM 100 MG/ML
40 INJECTION SUBCUTANEOUS EVERY 24 HOURS
Status: DISCONTINUED | OUTPATIENT
Start: 2019-08-30 | End: 2019-09-05 | Stop reason: HOSPADM

## 2019-08-30 RX ORDER — SODIUM CHLORIDE 0.9 % (FLUSH) 0.9 %
5-40 SYRINGE (ML) INJECTION AS NEEDED
Status: DISCONTINUED | OUTPATIENT
Start: 2019-08-30 | End: 2019-09-05 | Stop reason: HOSPADM

## 2019-08-30 RX ORDER — MORPHINE SULFATE 10 MG/ML
4 INJECTION, SOLUTION INTRAMUSCULAR; INTRAVENOUS
Status: COMPLETED | OUTPATIENT
Start: 2019-08-30 | End: 2019-08-30

## 2019-08-30 RX ORDER — LORAZEPAM 0.5 MG/1
0.5 TABLET ORAL
Status: DISCONTINUED | OUTPATIENT
Start: 2019-08-30 | End: 2019-08-30

## 2019-08-30 RX ORDER — ACETAMINOPHEN 325 MG/1
650 TABLET ORAL
Status: DISCONTINUED | OUTPATIENT
Start: 2019-08-30 | End: 2019-09-05 | Stop reason: HOSPADM

## 2019-08-30 RX ORDER — CARBOXYMETHYLCELLULOSE SODIUM 10 MG/ML
1 GEL OPHTHALMIC DAILY
Status: DISCONTINUED | OUTPATIENT
Start: 2019-08-31 | End: 2019-09-05 | Stop reason: HOSPADM

## 2019-08-30 RX ORDER — METHENAMINE HIPPURATE 1000 MG/1
1 TABLET ORAL 2 TIMES DAILY WITH MEALS
Status: DISCONTINUED | OUTPATIENT
Start: 2019-08-31 | End: 2019-09-05 | Stop reason: HOSPADM

## 2019-08-30 RX ORDER — ADHESIVE BANDAGE
30 BANDAGE TOPICAL DAILY PRN
Status: DISCONTINUED | OUTPATIENT
Start: 2019-08-30 | End: 2019-09-05 | Stop reason: HOSPADM

## 2019-08-30 RX ORDER — FUROSEMIDE 10 MG/ML
40 INJECTION INTRAMUSCULAR; INTRAVENOUS 2 TIMES DAILY
Status: DISCONTINUED | OUTPATIENT
Start: 2019-08-30 | End: 2019-09-01

## 2019-08-30 RX ORDER — POLYVINYL ALCOHOL 14 MG/ML
1 SOLUTION/ DROPS OPHTHALMIC DAILY
Status: DISCONTINUED | OUTPATIENT
Start: 2019-08-31 | End: 2019-08-30 | Stop reason: CLARIF

## 2019-08-30 RX ORDER — SODIUM CHLORIDE 0.9 % (FLUSH) 0.9 %
10 SYRINGE (ML) INJECTION
Status: COMPLETED | OUTPATIENT
Start: 2019-08-30 | End: 2019-08-30

## 2019-08-30 RX ORDER — IPRATROPIUM BROMIDE AND ALBUTEROL SULFATE 2.5; .5 MG/3ML; MG/3ML
3 SOLUTION RESPIRATORY (INHALATION)
Status: DISCONTINUED | OUTPATIENT
Start: 2019-08-30 | End: 2019-09-05 | Stop reason: HOSPADM

## 2019-08-30 RX ORDER — FAMOTIDINE 20 MG/1
20 TABLET, FILM COATED ORAL 2 TIMES DAILY
Status: DISCONTINUED | OUTPATIENT
Start: 2019-08-30 | End: 2019-09-05 | Stop reason: HOSPADM

## 2019-08-30 RX ORDER — GUAIFENESIN 600 MG/1
1200 TABLET, EXTENDED RELEASE ORAL EVERY 12 HOURS
Status: DISCONTINUED | OUTPATIENT
Start: 2019-08-30 | End: 2019-09-05 | Stop reason: HOSPADM

## 2019-08-30 RX ORDER — NALOXONE HYDROCHLORIDE 0.4 MG/ML
0.4 INJECTION, SOLUTION INTRAMUSCULAR; INTRAVENOUS; SUBCUTANEOUS AS NEEDED
Status: DISCONTINUED | OUTPATIENT
Start: 2019-08-30 | End: 2019-09-05 | Stop reason: HOSPADM

## 2019-08-30 RX ORDER — GUAIFENESIN 100 MG/5ML
81 LIQUID (ML) ORAL DAILY
Status: DISCONTINUED | OUTPATIENT
Start: 2019-08-31 | End: 2019-09-05 | Stop reason: HOSPADM

## 2019-08-30 RX ORDER — DIVALPROEX SODIUM 250 MG/1
250 TABLET, DELAYED RELEASE ORAL
Status: DISCONTINUED | OUTPATIENT
Start: 2019-08-30 | End: 2019-09-05 | Stop reason: HOSPADM

## 2019-08-30 RX ORDER — FACIAL-BODY WIPES
10 EACH TOPICAL EVERY OTHER DAY
Status: DISCONTINUED | OUTPATIENT
Start: 2019-08-30 | End: 2019-09-05 | Stop reason: HOSPADM

## 2019-08-30 RX ORDER — METOPROLOL TARTRATE 25 MG/1
25 TABLET, FILM COATED ORAL 2 TIMES DAILY
Status: DISCONTINUED | OUTPATIENT
Start: 2019-08-30 | End: 2019-09-04

## 2019-08-30 RX ORDER — ACETAMINOPHEN 325 MG/1
650 TABLET ORAL
Status: DISCONTINUED | OUTPATIENT
Start: 2019-08-30 | End: 2019-08-30 | Stop reason: SDUPTHER

## 2019-08-30 RX ORDER — SODIUM CHLORIDE 0.9 % (FLUSH) 0.9 %
5-40 SYRINGE (ML) INJECTION EVERY 8 HOURS
Status: DISCONTINUED | OUTPATIENT
Start: 2019-08-30 | End: 2019-09-05 | Stop reason: HOSPADM

## 2019-08-30 RX ORDER — OLANZAPINE 2.5 MG/1
2.5 TABLET ORAL
Status: DISCONTINUED | OUTPATIENT
Start: 2019-08-30 | End: 2019-09-05 | Stop reason: HOSPADM

## 2019-08-30 RX ORDER — ONDANSETRON 2 MG/ML
4 INJECTION INTRAMUSCULAR; INTRAVENOUS
Status: DISCONTINUED | OUTPATIENT
Start: 2019-08-30 | End: 2019-09-05 | Stop reason: HOSPADM

## 2019-08-30 RX ADMIN — ENOXAPARIN SODIUM 40 MG: 40 INJECTION SUBCUTANEOUS at 21:53

## 2019-08-30 RX ADMIN — CEFTRIAXONE 1 G: 1 INJECTION, POWDER, FOR SOLUTION INTRAMUSCULAR; INTRAVENOUS at 21:51

## 2019-08-30 RX ADMIN — LORAZEPAM 0.5 MG: 0.5 TABLET ORAL at 19:37

## 2019-08-30 RX ADMIN — MORPHINE SULFATE 4 MG: 10 INJECTION, SOLUTION INTRAMUSCULAR; INTRAVENOUS at 15:03

## 2019-08-30 RX ADMIN — SODIUM CHLORIDE 1000 ML: 900 INJECTION, SOLUTION INTRAVENOUS at 13:52

## 2019-08-30 RX ADMIN — Medication 10 ML: at 21:55

## 2019-08-30 RX ADMIN — BISACODYL 10 MG: 10 SUPPOSITORY RECTAL at 22:01

## 2019-08-30 RX ADMIN — IPRATROPIUM BROMIDE AND ALBUTEROL SULFATE 3 ML: .5; 3 SOLUTION RESPIRATORY (INHALATION) at 20:55

## 2019-08-30 RX ADMIN — IPRATROPIUM BROMIDE AND ALBUTEROL SULFATE 3 ML: .5; 3 SOLUTION RESPIRATORY (INHALATION) at 23:06

## 2019-08-30 RX ADMIN — FAMOTIDINE 20 MG: 20 TABLET ORAL at 21:52

## 2019-08-30 RX ADMIN — Medication 10 ML: at 15:57

## 2019-08-30 NOTE — ED NOTES
TRANSFER - OUT REPORT:    Verbal report given to Erica escudero RN(name) on [employer]  being transferred to 212(unit) for routine progression of care       Report consisted of patients Situation, Background, Assessment and   Recommendations(SBAR). Information from the following report(s) SBAR, ED Summary and Recent Results was reviewed with the receiving nurse. Lines:   Peripheral IV 38/82/22 Right Basilic (Active)   Site Assessment Clean, dry, & intact 8/30/2019  3:03 PM   Phlebitis Assessment 0 8/30/2019  3:03 PM   Infiltration Assessment 0 8/30/2019  3:03 PM   Dressing Status Clean, dry, & intact 8/30/2019  3:03 PM   Hub Color/Line Status Pink 8/30/2019  3:03 PM   Action Taken Blood drawn 8/30/2019  3:03 PM   Alcohol Cap Used No 8/30/2019  3:03 PM        Opportunity for questions and clarification was provided.       Patient transported with:   O2 @ 3 liters

## 2019-08-30 NOTE — ED TRIAGE NOTES
Patient arrives via EMS from facility. States called out d/t SOB. States 90% on RA, placed on NRB, currently 100%. Left side lung congested with crackles. Generalized edema. Parapalegic. Patient on lasix. Patient hospice and DNR.

## 2019-08-30 NOTE — H&P
Hospitalist H&P Note     Admit Date:  2019 12:59 PM   Name:  Alejandra Lagos   Age:  70 y.o.  :  1940   MRN:  [id number]   PCP:  Dieter Bhatti, Not On File  Treatment Team: Attending Provider: Declan Gregg MD; Primary Nurse: Meeta Gray RN    HPI:   Patient history was obtained from the ER provider prior to seeing the patient. 10 systems reviewed and negative except as noted in HPI.   Past Medical History:   Diagnosis Date    Acute pain due to trauma     Anxiety     Bipolar 1 disorder ([address]     unspecified    Brief psychotic disorder     Chronic UTI      Complicated UTI (urinary tract infection)      Decubitus ulcer     Encephalopathy      GERD (gastroesophageal reflux disease)     Hereditary and idiopathic neuropathy     History of femur fracture     History of kidney stones     History of rectal sphincterotomy     Hx of urinary frequency     Hyperlipidemia     Hyperlipidemia     Hypertensive retinopathy     Hyponatremia     Kidney stone     Major depressive disorder     Muscle weakness     Neurogenic bladder, NOS     Obstructive and reflux uropathy     [facility] [address]     Psychiatric disorder     PSDS with psychosis    Quadriplegia ([facility] [address] at age 47- per nursing home records    C5-C7 per office note    Scrotal abscess     history of     Sepsis      Suprapubic catheter ([facility] [address]     Thyroid disease       Past Surgical History:   Procedure Laterality Date    HX OTHER SURGICAL      Decubitus debridement    HX UROLOGICAL      Kidney Stone Extraction with Stent    [place] REMOVAL WITH INSERTION OF SUPRAPUBIC CATHETER        Allergies   Allergen Reactions    Bactrim [Sulfamethoprim Ds] Shortness of Breath    Benadryl [Diphenhydramine Hcl] Unknown (comments)    Ceftin [Cefuroxime Axetil] Unknown (comments)    Cefuroxime Anxiety    Fortaz [Ceftazidime] Anxiety    Levaquin [Levofloxacin] Unknown (comments)    Phenergan [Promethazine] Unknown (comments)  Pyridium [Phenazopyridine] Nausea Only    Jatin Hint Unknown (comments)     Noted as quinalones      Social History     Tobacco Use    Smoking status: Never Smoker    Smokeless tobacco: Never Used   Substance Use Topics    Alcohol use: No      Family History   Family history unknown: Yes      Immunization History   Administered Date(s) Administered    TB Skin Test (PPD) Intradermal 12/28/2015     PTA Medications:  Prior to Admission Medications   Prescriptions Last Dose Informant Patient Reported? Taking? SERTRALINE HCL (ZOLOFT PO)   Yes No   Sig: Take 50 mg by mouth every morning. acetaminophen (TYLENOL) 500 mg tablet   Yes No   Sig: Take 650 mg by mouth every six (6) hours as needed for Pain. albuterol (PROVENTIL HFA, VENTOLIN HFA, PROAIR HFA) 90 mcg/actuation inhaler   Yes No   Sig: Take 2 Puffs by inhalation four (4) times daily. aluminum-magnesium hydroxide (MAALOX) 200-200 mg/5 mL suspension   Yes No   Sig: Take 30 mL by mouth every six (6) hours as needed for Indigestion. bisacodyl (DULCOLAX) 10 mg suppository   Yes No   Sig: Insert 10 mg into rectum every other day. cholecalciferol (VITAMIN D3) 50,000 unit capsule   Yes No   Sig: Take 50,000 Units by mouth every seven (7) days. cyanocobalamin 1,000 mcg tablet   Yes No   Sig: Take 1,000 mcg by mouth every morning. divalproex DR (DEPAKOTE) 250 mg tablet   Yes No   Sig: Take 250 mg by mouth nightly. famotidine (PEPCID) 20 mg tablet   Yes No   Sig: Take 20 mg by mouth two (2) times a day. magnesium hydroxide (CARPIO MILK OF MAGNESIA) 400 mg/5 mL suspension   Yes No   Sig: Take 30 mL by mouth daily as needed for Constipation. methadone (DOLOPHINE) 5 mg tablet   Yes No   Sig: Take 5 mg by mouth every eight (8) hours. methenamine hippurate (HIPREX) 1 gram tablet   Yes No   Sig: Take 1 g by mouth two (2) times daily (with meals).    metoprolol tartrate (LOPRESSOR) 25 mg tablet   Yes No   Sig: Take 25 mg by mouth two (2) times a day.   multivitamin (ONE A DAY) tablet   Yes No   Sig: Take 1 Tab by mouth every morning. naloxegol (MOVANTIK) 25 mg tab tablet   No No   Sig: Take 1 Tab by mouth Daily (before breakfast). ondansetron hcl (ZOFRAN) 4 mg tablet   Yes No   Sig: Take 4 mg by mouth every six (6) hours as needed for Nausea. polyethylene glycol (MIRALAX) 17 gram packet   Yes No   Sig: Take 17 g by mouth every morning. polyvinyl alcohol-povidon,PF, (REFRESH CLASSIC) 1.4-0.6 % ophthalmic solution   Yes No   Sig: Administer 1-2 Drops to both eyes as needed. propantheline (PROBANTHINE) 15 mg tablet   Yes No   Sig: Take 15 mg by mouth two (2) times a day. Facility-Administered Medications: None       Objective:     Patient Vitals for the past 24 hrs:   Temp Pulse Resp BP SpO2   08/30/19 1800  91   99 %   08/30/19 1708  84  114/55 94 %   08/30/19 1702    102/60    08/30/19 1657    102/60    08/30/19 1546  73  90/50 99 %   08/30/19 1302 97.8 °F (36.6 °C) 76 28 100/50 94 %     Oxygen Therapy  O2 Sat (%): 99 % (08/30/19 1800)  Pulse via Oximetry: 91 beats per minute (08/30/19 1800)  O2 Device: Room air;Nasal cannula (08/30/19 1544)  O2 Flow Rate (L/min): 2 l/min (08/30/19 1544)  No intake or output data in the 24 hours ending 08/30/19 1835    Physical Exam:  General:    Well nourished. Alert. Eyes:   Normal sclera. Extraocular movements intact. ENT:  Normocephalic, atraumatic. Moist mucous membranes  Neck:  Supple, no lymphadenopathy or JVD  CV:   RRR. No m/r/g. Peripheral pulses 2+. Capillary refill <2s. Lungs:  CTAB. No wheezing, rhonchi, or rales. Abdomen: Soft, nontender, nondistended. Extremities: Warm and dry. No cyanosis or edema. Neurologic: CN II-XII grossly intact. Sensation intact. Skin:     No rashes or jaundice. Normal coloration  Psych:  Normal mood and affect. I reviewed the labs, imaging, EKGs, telemetry, and other studies done this admission.   Data Review:   Recent Results (from the past 24 hour(s))   EKG, 12 LEAD, INITIAL    Collection Time: 08/30/19  1:46 PM   Result Value Ref Range    Ventricular Rate 72 BPM    Atrial Rate 75 BPM    QRS Duration 70 ms    Q-T Interval 416 ms    QTC Calculation (Bezet) 455 ms    Calculated R Axis 25 degrees    Calculated T Axis 41 degrees    Diagnosis       !!! Poor data quality, interpretation may be adversely affected  Atrial fibrillation  Low voltage QRS  Septal infarct (cited on or before 19-JUN-2018)  Abnormal ECG  When compared with ECG of 20-JUN-2018 07:41,  No significant change was found     METABOLIC PANEL, COMPREHENSIVE    Collection Time: 08/30/19  2:22 PM   Result Value Ref Range    Sodium 126 (L) 136 - 145 mmol/L    Potassium 4.4 3.5 - 5.1 mmol/L    Chloride 92 (L) 98 - 107 mmol/L    CO2 29 21 - 32 mmol/L    Anion gap 5 (L) 7 - 16 mmol/L    Glucose 130 (H) 65 - 100 mg/dL    BUN 8 8 - 23 MG/DL    Creatinine 0.39 (L) 0.8 - 1.5 MG/DL    GFR est AA >60 >60 ml/min/1.73m2    GFR est non-AA >60 >60 ml/min/1.73m2    Calcium 7.4 (L) 8.3 - 10.4 MG/DL    Bilirubin, total 0.5 0.2 - 1.1 MG/DL    ALT (SGPT) 15 12 - 65 U/L    AST (SGOT) 27 15 - 37 U/L    Alk.  phosphatase 88 50 - 136 U/L    Protein, total 6.5 6.3 - 8.2 g/dL    Albumin 2.0 (L) 3.2 - 4.6 g/dL    Globulin 4.5 (H) 2.3 - 3.5 g/dL    A-G Ratio 0.4 (L) 1.2 - 3.5     TROPONIN I    Collection Time: 08/30/19  2:22 PM   Result Value Ref Range    Troponin-I, Qt. <0.02 (L) 0.02 - 0.05 NG/ML   POC LACTIC ACID    Collection Time: 08/30/19  2:28 PM   Result Value Ref Range    Lactic Acid (POC) 0.88 0.5 - 1.9 mmol/L   URINE MICROSCOPIC    Collection Time: 08/30/19  4:00 PM   Result Value Ref Range    WBC >100 0 /hpf    RBC 20-50 0 /hpf    Epithelial cells 0-3 0 /hpf    Bacteria 4+ (H) 0 /hpf    Casts 0 0 /lpf    Crystals, urine 0 0 /LPF    Mucus 0 0 /lpf   CBC WITH AUTOMATED DIFF    Collection Time: 08/30/19  4:45 PM   Result Value Ref Range    WBC 6.0 4.3 - 11.1 K/uL    RBC 2.93 (L) 4.23 - 5.6 M/uL    HGB 8.2 (L) 13.6 - 17.2 g/dL    HCT 26.7 (L) 41.1 - 50.3 %    MCV 91.1 79.6 - 97.8 FL    MCH 28.0 26.1 - 32.9 PG    MCHC 30.7 (L) 31.4 - 35.0 g/dL    RDW 19.9 (H) 11.9 - 14.6 %    PLATELET 593 556 - 432 K/uL    MPV 9.4 9.4 - 12.3 FL    ABSOLUTE NRBC 0.00 0.0 - 0.2 K/uL    DF AUTOMATED      NEUTROPHILS 65 43 - 78 %    LYMPHOCYTES 22 13 - 44 %    MONOCYTES 9 4.0 - 12.0 %    EOSINOPHILS 3 0.5 - 7.8 %    BASOPHILS 0 0.0 - 2.0 %    IMMATURE GRANULOCYTES 1 0.0 - 5.0 %    ABS. NEUTROPHILS 3.9 1.7 - 8.2 K/UL    ABS. LYMPHOCYTES 1.3 0.5 - 4.6 K/UL    ABS. MONOCYTES 0.6 0.1 - 1.3 K/UL    ABS. EOSINOPHILS 0.2 0.0 - 0.8 K/UL    ABS. BASOPHILS 0.0 0.0 - 0.2 K/UL    ABS. IMM. GRANS. 0.0 0.0 - 0.5 K/UL   BNP    Collection Time: 08/30/19  4:45 PM   Result Value Ref Range     (H) 0 pg/mL       All Micro Results     Procedure Component Value Units Date/Time    CULTURE, URINE [[id number]]     Order Status:  Sent Specimen:  Urine from Clean catch           Other Studies:  Ct Abd Pelv Wo Cont    Result Date: 8/30/2019  Clinical History: The patient is a 70years year old Male presenting with symptoms of large abd wall hematoma Comparison:  Abdomen pelvis CT 4/26/2017. Technique: Thin slice axial images were obtained through the abdomen and pelvis without intravenous and with oral contrast.  Coronal reformatted images were also provided for review. All CT scans at this facility are performed using dose reduction/dose modulation techniques, as appropriate the performed exam, including the following: Automated Exposure Control; Adjustment of the mA and/or kV according to patient size (this includes techniques or standardized protocols for targeted exams where dose is matched to indication/reason for exam); and Use of Iterative Reconstruction Technique. Radiation Exposure Indices: Reference Air Kerma (Ka,r) = [id number] mGy-cm Findings:  Abdomen: Lung bases:  Moderate bilateral pleural effusions are demonstrated with bibasilar atelectasis, left greater than right. Liver: Mildly lobular in contour and enlarged at 20 cm consistent with cirrhosis. Minimal fluid surrounds the liver and spleen and extends into the paracolic gutters, right greater than left. Biliary: Unremarkable gallbladder. No evidence of intra or extrahepatic biliary dilatation. Pancreas: Moderate atrophic and fatty replaced. Spleen: Normal in size and contour without focal lesion. Adrenal glands: Normal in size without focal lesion. Kidneys: Atrophic right kidney with right ureteral stent in place. Small right renal stones are suggested Bowel: No evidence of obstruction or focal lesion. Retroperitoneum/vasculature: No evidence of significant adenopathy. Aortoiliac atherosclerotic disease. Abdominal soft tissues: Large left paramedian rectus sheath hematoma measuring approximately 20 cm rostrocaudal by 10 cm transverse by 6 cm AP. Osseous structures: There is chronic dislocation of the right hip with joint effusion and tract extending to lateral skin margin. Severe degenerative changes involving the left femoral head with hardware in the proximal femoral shaft. Pelvis: Suprapubic Castillo catheter remains in place in bladder. No significant adenopathy or free fluid. Lord Gordon IMPRESSION: 1. Large left paramedian rectus sheath hematoma. 2. Cirrhotic enlarged liver with mild ascites. 3. Atrophic right kidney with right ureteral stent remaining in place. 4. Suprapubic Castillo balloon catheter in bladder. 5. Moderate pleural effusions with basilar atelectasis, left greater than right. 6. Chronic dislocation right hip is again noted with fluid tract extending to the skin surface. Severe degenerative changes throughout left hip. CPT code(s): 71877, [id number]     Xr Chest Port    Result Date: 8/30/2019  Clinical History: The patient is a 70years year old Male presenting with symptoms of sob Comparison:  Chest x-ray 6/21/2018 Findings:  Frontal view of the chest was obtained.  There is developing pulmonary vascular congestion with left basilar infiltrate and effusion. Minimal effusion is also suggested on the right. The cardiomediastinal silhouette is enlarged with aortic atherosclerotic disease. There are no acute osseous abnormalities. Impression:  CHF/volume overload. CPT code(s) 26189       Assessment and Plan:     Hospital Problems as of 8/30/2019 Date Reviewed: 8/10/2016          Codes Class Noted - Resolved POA    * (Principal) Acute respiratory failure ([facility] [address] ICD-10-CM: J96.00  ICD-9-CM: 518.81  8/30/2019 - Present Unknown        Acute on chronic congestive heart failure with left ventricular diastolic dysfunction (HCC) (Chronic) ICD-10-CM: I50.33  ICD-9-CM: 428.33, 428.0  8/30/2019 - Present Yes        Abdominal wall hematoma ICD-10-CM: S30. 1XXA  ICD-9-CM: 922.2  8/30/2019 - Present Yes        UTI (urinary tract infection) ICD-10-CM: N39.0  ICD-9-CM: 599.0  12/31/2015 - Present Yes        Neurogenic bladder (Chronic) ICD-10-CM: N31.9  ICD-9-CM: 596.54  5/9/2012 - Present Yes        Quadriplegia ([facility] [address] (Chronic) ICD-10-CM: G82.50  ICD-9-CM: 344.00  9/9/2009 - Present Yes              PLAN:  Acute resp failure  -Oxygen  -Duo nebs  -IV lasix    Acute on chronic LV dysfunction  -As above  -Remote tele    UTI  -IV rocephin    Abd wall hematoma  -Gen surg consult    Quadriplegia  -Supportive    Discharge planning:  Return to [facility]  DVT ppx:  Lovenox    Code status:   DNR  Decision Maker:  None on file    Admit status:  Inpatient    Case reviewed with supervising physician - GRICEL Knight MD    Estimated LOS:  Greater than 2 midnights  Risk:  high    Signed:  Samira Cali NP

## 2019-08-30 NOTE — ED NOTES
Patient yelling out constantly after being checked on several times. Patient states he wants to go up stairs. Patient asking for water. When given a cup of water, patient pours on top of his head. When asked why he did this, he states it cools him down. Patient offered an ice pack and states this will help.
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
## 2019-08-31 ENCOUNTER — APPOINTMENT (OUTPATIENT)
Dept: GENERAL RADIOLOGY | Age: 79
DRG: 291 | End: 2019-08-31
Attending: INTERNAL MEDICINE
Payer: MEDICARE

## 2019-08-31 LAB
ANION GAP SERPL CALC-SCNC: 5 MMOL/L (ref 7–16)
BASOPHILS # BLD: 0 K/UL (ref 0–0.2)
BASOPHILS NFR BLD: 1 % (ref 0–2)
BUN SERPL-MCNC: 8 MG/DL (ref 8–23)
CALCIUM SERPL-MCNC: 7.8 MG/DL (ref 8.3–10.4)
CHLORIDE SERPL-SCNC: 93 MMOL/L (ref 98–107)
CO2 SERPL-SCNC: 30 MMOL/L (ref 21–32)
CREAT SERPL-MCNC: 0.32 MG/DL (ref 0.8–1.5)
DIFFERENTIAL METHOD BLD: ABNORMAL
EOSINOPHIL # BLD: 0.1 K/UL (ref 0–0.8)
EOSINOPHIL NFR BLD: 2 % (ref 0.5–7.8)
ERYTHROCYTE [DISTWIDTH] IN BLOOD BY AUTOMATED COUNT: 19.9 % (ref 11.9–14.6)
GLUCOSE SERPL-MCNC: 108 MG/DL (ref 65–100)
HCT VFR BLD AUTO: 26.1 % (ref 41.1–50.3)
HGB BLD-MCNC: 7.9 G/DL (ref 13.6–17.2)
IMM GRANULOCYTES # BLD AUTO: 0 K/UL (ref 0–0.5)
IMM GRANULOCYTES NFR BLD AUTO: 1 % (ref 0–5)
LYMPHOCYTES # BLD: 0.8 K/UL (ref 0.5–4.6)
LYMPHOCYTES NFR BLD: 18 % (ref 13–44)
MCH RBC QN AUTO: 28.1 PG (ref 26.1–32.9)
MCHC RBC AUTO-ENTMCNC: 30.3 G/DL (ref 31.4–35)
MCV RBC AUTO: 92.9 FL (ref 79.6–97.8)
MONOCYTES # BLD: 0.4 K/UL (ref 0.1–1.3)
MONOCYTES NFR BLD: 8 % (ref 4–12)
NEUTS SEG # BLD: 3.1 K/UL (ref 1.7–8.2)
NEUTS SEG NFR BLD: 71 % (ref 43–78)
NRBC # BLD: 0 K/UL (ref 0–0.2)
PLATELET # BLD AUTO: 159 K/UL (ref 150–450)
PMV BLD AUTO: 8.7 FL (ref 9.4–12.3)
POTASSIUM SERPL-SCNC: 4.1 MMOL/L (ref 3.5–5.1)
RBC # BLD AUTO: 2.81 M/UL (ref 4.23–5.6)
SODIUM SERPL-SCNC: 128 MMOL/L (ref 136–145)
WBC # BLD AUTO: 4.3 K/UL (ref 4.3–11.1)

## 2019-08-31 PROCEDURE — 94640 AIRWAY INHALATION TREATMENT: CPT

## 2019-08-31 PROCEDURE — 93306 TTE W/DOPPLER COMPLETE: CPT

## 2019-08-31 PROCEDURE — 85025 COMPLETE CBC W/AUTO DIFF WBC: CPT

## 2019-08-31 PROCEDURE — 97165 OT EVAL LOW COMPLEX 30 MIN: CPT

## 2019-08-31 PROCEDURE — 71045 X-RAY EXAM CHEST 1 VIEW: CPT

## 2019-08-31 PROCEDURE — 74011250636 HC RX REV CODE- 250/636: Performed by: NURSE PRACTITIONER

## 2019-08-31 PROCEDURE — 77010033678 HC OXYGEN DAILY

## 2019-08-31 PROCEDURE — 74011250637 HC RX REV CODE- 250/637: Performed by: NURSE PRACTITIONER

## 2019-08-31 PROCEDURE — 65270000029 HC RM PRIVATE

## 2019-08-31 PROCEDURE — 74011000250 HC RX REV CODE- 250: Performed by: NURSE PRACTITIONER

## 2019-08-31 PROCEDURE — 65660000000 HC RM CCU STEPDOWN

## 2019-08-31 PROCEDURE — 74011000258 HC RX REV CODE- 258: Performed by: INTERNAL MEDICINE

## 2019-08-31 PROCEDURE — 36415 COLL VENOUS BLD VENIPUNCTURE: CPT

## 2019-08-31 PROCEDURE — 74011250637 HC RX REV CODE- 250/637: Performed by: INTERNAL MEDICINE

## 2019-08-31 PROCEDURE — 74011250636 HC RX REV CODE- 250/636: Performed by: INTERNAL MEDICINE

## 2019-08-31 PROCEDURE — 94760 N-INVAS EAR/PLS OXIMETRY 1: CPT

## 2019-08-31 PROCEDURE — 80048 BASIC METABOLIC PNL TOTAL CA: CPT

## 2019-08-31 RX ORDER — LORAZEPAM 0.5 MG/1
1 TABLET ORAL
Status: DISCONTINUED | OUTPATIENT
Start: 2019-08-31 | End: 2019-09-05 | Stop reason: HOSPADM

## 2019-08-31 RX ADMIN — FAMOTIDINE 20 MG: 20 TABLET ORAL at 17:33

## 2019-08-31 RX ADMIN — Medication 10 ML: at 21:24

## 2019-08-31 RX ADMIN — OLANZAPINE 2.5 MG: 2.5 TABLET, FILM COATED ORAL at 21:22

## 2019-08-31 RX ADMIN — IPRATROPIUM BROMIDE AND ALBUTEROL SULFATE 3 ML: .5; 3 SOLUTION RESPIRATORY (INHALATION) at 15:53

## 2019-08-31 RX ADMIN — GUAIFENESIN 1200 MG: 600 TABLET, EXTENDED RELEASE ORAL at 09:37

## 2019-08-31 RX ADMIN — Medication 10 ML: at 06:12

## 2019-08-31 RX ADMIN — CEFTRIAXONE 1 G: 1 INJECTION, POWDER, FOR SOLUTION INTRAMUSCULAR; INTRAVENOUS at 17:33

## 2019-08-31 RX ADMIN — IPRATROPIUM BROMIDE AND ALBUTEROL SULFATE 3 ML: .5; 3 SOLUTION RESPIRATORY (INHALATION) at 19:50

## 2019-08-31 RX ADMIN — DIVALPROEX SODIUM 250 MG: 250 TABLET, DELAYED RELEASE ORAL at 21:22

## 2019-08-31 RX ADMIN — FUROSEMIDE 40 MG: 10 INJECTION, SOLUTION INTRAMUSCULAR; INTRAVENOUS at 09:37

## 2019-08-31 RX ADMIN — GUAIFENESIN 1200 MG: 600 TABLET, EXTENDED RELEASE ORAL at 21:22

## 2019-08-31 RX ADMIN — ASPIRIN 81 MG 81 MG: 81 TABLET ORAL at 09:37

## 2019-08-31 RX ADMIN — SERTRALINE HYDROCHLORIDE 50 MG: 50 TABLET ORAL at 09:37

## 2019-08-31 RX ADMIN — LORAZEPAM 1 MG: 0.5 TABLET ORAL at 15:39

## 2019-08-31 RX ADMIN — IPRATROPIUM BROMIDE AND ALBUTEROL SULFATE 3 ML: .5; 3 SOLUTION RESPIRATORY (INHALATION) at 07:38

## 2019-08-31 RX ADMIN — IPRATROPIUM BROMIDE AND ALBUTEROL SULFATE 3 ML: .5; 3 SOLUTION RESPIRATORY (INHALATION) at 03:43

## 2019-08-31 RX ADMIN — ENOXAPARIN SODIUM 40 MG: 40 INJECTION SUBCUTANEOUS at 21:28

## 2019-08-31 RX ADMIN — FAMOTIDINE 20 MG: 20 TABLET ORAL at 09:37

## 2019-08-31 RX ADMIN — IPRATROPIUM BROMIDE AND ALBUTEROL SULFATE 3 ML: .5; 3 SOLUTION RESPIRATORY (INHALATION) at 11:32
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
## 2019-08-31 NOTE — ROUTINE PROCESS
CHF teaching held until cardiology assessment today, will follow.       Cardiac diet/ FR  Palliative care: ACP on file
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
## 2019-08-31 NOTE — PROGRESS NOTES
OCCUPATIONAL THERAPY: Initial Assessment and Discharge 8/31/2019  INPATIENT:    [place] / [place] / Product Type: Medicare /      NAME/AGE/GENDER: Jaelyn Gutierrez is a 70 y.o. male   PRIMARY DIAGNOSIS:  Acute respiratory failure ([facility] [J96.00] Acute respiratory failure ([facility] [address]   Acute respiratory failure ([facility] [address]          ICD-10: Treatment Diagnosis:    Generalized Muscle Weakness (M62.81)   Precautions/Allergies:     Bactrim [sulfamethoprim ds]; Benadryl [diphenhydramine hcl]; Ceftin [cefuroxime axetil]; Cefuroxime; Dulcie Small; Levaquin [levofloxacin]; Phenergan [promethazine]; Pyridium [phenazopyridine]; and Kodi Sears      ASSESSMENT:     Mr. Yovanny Jasso was admitted with respiratory failure. 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 Pt resides at [facility] reports that he is able to feed himself and complete some grooming and hygiene with his universal cuff, otherwise requires assistance for all ADLs. Pt stated that he is able to pull himself upright with an overhead trapeze, otherwise requires total assistance for bed mobility and WC transfers. This session, pt presented at his functional baseline for ADLs. Pt has not had a functional change in status while hospitalized and does not require further skilled OT services at this time, recommend d/c back to SNF when medically cleared. REHAB RECOMMENDATIONS (at time of discharge pending progress):    Placement: It is my opinion, based on this patient's performance to date, that Mr. Yovanny Jasso may benefit from being discharged with NO further skilled therapy due to a proven ability to function at baseline.   Equipment:   None at this time              OCCUPATIONAL PROFILE AND HISTORY:   History of Present Injury/Illness (Reason for Referral):  See H&P  Past Medical History/Comorbidities:   Mr. Yovanny Jasso  has a past medical history of Acute pain due to trauma, Anxiety, Bipolar 1 disorder ([facility] [address] Brief psychotic disorder, Chronic UTI ( ), Complicated UTI (urinary tract infection) ( ), Decubitus ulcer, Encephalopathy ( ), GERD (gastroesophageal reflux disease), Hereditary and idiopathic neuropathy, History of femur fracture, History of kidney stones, History of rectal sphincterotomy, urinary frequency, Hyperlipidemia, Hyperlipidemia, Hypertensive retinopathy, Hyponatremia, Kidney stone, Major depressive disorder, Muscle weakness, Neurogenic bladder, NOS, Obstructive and reflux uropathy, Paranoid schizophrenia ([facility] [address] Psychiatric disorder, Quadriplegia ([facility] [address] (at age 47- per nursing home records), Scrotal abscess, Sepsis ( ), Suprapubic catheter ([facility] [address] and Thyroid disease. He also has no past medical history of Dementia, Endocrine disease, or Sleep disorder. Mr. Shelly Her  has a past surgical history that includes hx urological; hx other surgical; and pr removal with insertion of suprapubic catheter. Social History/Living Environment:   Home Environment: [address] [place] [address] Name: Warner  Living Alone: No  Support Systems: Skilled nursing facility  Patient Expects to be Discharged to[de-identified] Skilled nursing facility  Current DME Used/Available at Home: (universal cuff)  Prior Level of Function/Work/Activity:  C6-7 quad. Able to assist with feeding, grooming, and hygiene with U-cuff. From SNF. Number of Personal Factors/Comorbidities that affect the Plan of Care: Expanded review of therapy/medical records (1-2):  MODERATE COMPLEXITY   ASSESSMENT OF OCCUPATIONAL PERFORMANCE[de-identified]   Activities of Daily Living:   Basic ADLs (From Assessment) Complex ADLs (From Assessment)   Feeding: Setup, Other (comment)(with u-cuff)  Oral Facial Hygiene/Grooming: Minimum assistance  Bathing: Maximum assistance  Upper Body Dressing: Maximum assistance  Lower Body Dressing: Total assistance  Toileting:  Total assistance     Grooming/Bathing/Dressing Activities of Daily Living                                   Most Recent Physical Functioning:   Gross Assessment:  AROM: Generally decreased, functional  Strength: Generally decreased, functional  Coordination: Grossly decreased, non-functional               Posture:     Balance:    Bed Mobility:     Wheelchair Mobility:     Transfers:               Patient Vitals for the past 6 hrs:   BP SpO2 O2 Flow Rate (L/min) Pulse   19 0738 -- 99 % 1 l/min --   19 0903 97/48 92 % -- 100   19 1109 (!) 86/44 93 % -- 94   19 1132 -- 92 % -- --       Mental Status  Neurologic State: Drowsy  Orientation Level: Oriented to person, Oriented to place  Cognition: Decreased attention/concentration, Follows commands                          Physical Skills Involved:  Strength  Fine Motor Control  Gross Motor Control Cognitive Skills Affected (resulting in the inability to perform in a timely and safe manner):  Executive Function Psychosocial Skills Affected:  none    Number of elements that affect the Plan of Care: 3-5:  MODERATE COMPLEXITY   CLINICAL DECISION MAKIN[address] [place] [address] AM-PAC 6 Clicks   Daily Activity Inpatient Short Form  How much help from another person does the patient currently need. .. Total A Lot A Little None   1. Putting on and taking off regular lower body clothing? ? 1   ? 2   ? 3   ? 4   2. Bathing (including washing, rinsing, drying)? ? 1   ? 2   ? 3   ? 4   3. Toileting, which includes using toilet, bedpan or urinal?   ? 1   ? 2   ? 3   ? 4   4. Putting on and taking off regular upper body clothing? ? 1   ? 2   ? 3   ? 4   5. Taking care of personal grooming such as brushing teeth? ? 1   ? 2   ? 3   ? 4   6. Eating meals? ? 1   ? 2   ? 3   ? 4   © 2007, Trustees of [address] [place] [id number], under license to [employer]. All rights reserved      Score:  Initial: 11 Most Recent: X (Date: -- )    Interpretation of Tool:  Represents activities that are increasingly more difficult (i.e. Bed mobility, Transfers, Gait).        Use of outcome tool(s) and clinical judgement create a POC that gives a: LOW COMPLEXITY         TREATMENT:   (In addition to Assessment/Re-Assessment sessions the following treatments were rendered)     Pre-treatment Symptoms/Complaints:    Pain: Initial:   Pain Intensity 1: 0  Post Session:  0     Assessment/Reassessment only, no treatment provided today    Braces/Orthotics/Lines/Etc:   O2 Device: Room air  Treatment/Session Assessment:    Response to Treatment:  no adverse reaction   Interdisciplinary Collaboration:   Occupational Therapist  Registered Nurse  After treatment position/precautions:   Supine in bed  Bed/Chair-wheels locked  Bed in low position  Call light within reach  RN notified     Total Treatment Duration:  OT Patient Time In/Time Out  Time In: 1151  Time Out: [address] Cassy Foil

## 2019-08-31 NOTE — PROGRESS NOTES
08/30/19 2045   Dual Skin Pressure Injury Assessment   Dual Skin Pressure Injury Assessment X   Second Care Provider (Based on [address] [place] [address]) Rony Nap   Sacrum  Mid   Gluteal Cleft    (tear)     R gluteal tear; small open areas on sacral area;  Upper R back bruising; rash on groin; Suprapubic catheter intact
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
## 2019-08-31 NOTE — PROGRESS NOTES
END OF SHIFT NOTE:    INTAKE/OUTPUT  08/30 0701 - 08/31 0700  In: -   Out: 600 [Urine:600]  Voiding: YES  Catheter:Suprapubic  Drain:   Supra-pubic Catheter (Active)   Indications for Use Chronic urinary retention/bladder outlet obstruction 8/31/2019  1:39 AM   Status Draining;Patent 8/31/2019  1:39 AM   Site Condition No abnormalities 8/31/2019  1:39 AM   Castillo Catheter Care Completed No 8/31/2019  1:39 AM   Drainage Tube Clipped to Bed Yes 8/31/2019  1:39 AM   Catheter Secured to Thigh No 8/31/2019  1:39 AM   Tamper Seal Intact No 8/31/2019  1:39 AM   Bag Below Bladder/Not on Floor Yes 8/31/2019  1:39 AM   Lack of Dependent Loop in Tubing Yes 8/31/2019  1:39 AM   Drainage Bag Less Than Half Full Yes 8/31/2019  1:39 AM   Sterile Solution Used for  Irrigation N/A 8/31/2019  1:39 AM   Urine Output (mL) 150 ml 8/31/2019  3:42 AM               Flatus: Patient does have flatus present. Stool:  1 occurrences. Characteristics:  Stool Assessment  Stool Color: Brown  Stool Appearance: Soft  Stool Amount: Small  Stool Source/Status: Rectum    Emesis: 0 occurrences. Characteristics:        VITAL SIGNS  Patient Vitals for the past 12 hrs:   Temp Pulse Resp BP SpO2   08/31/19 0343     98 %   08/31/19 0342 97.5 °F (36.4 °C) 88 20 105/67 95 %   08/31/19 0030  (!) 121      08/30/19 2326 97.8 °F (36.6 °C) 73 18 101/53 97 %   08/30/19 2306     98 %   08/30/19 2101 97.6 °F (36.4 °C) 87 18 98/62 97 %   08/30/19 2100    98/62    08/30/19 2055     97 %   08/30/19 1906  93   99 %       Pain Assessment                Ambulating  No    Shift report given to oncoming nurse at the bedside.     Julia Lewis RN

## 2019-08-31 NOTE — PROGRESS NOTES
Patient's BP is 86/44 but patient is Asymptomatic, notified HUBERT Patiño. No further orders at this time, will continue to monitor.

## 2019-09-01 LAB
ANION GAP SERPL CALC-SCNC: 5 MMOL/L (ref 7–16)
BASOPHILS # BLD: 0 K/UL (ref 0–0.2)
BASOPHILS NFR BLD: 1 % (ref 0–2)
BUN SERPL-MCNC: 7 MG/DL (ref 8–23)
CALCIUM SERPL-MCNC: 7.9 MG/DL (ref 8.3–10.4)
CHLORIDE SERPL-SCNC: 93 MMOL/L (ref 98–107)
CO2 SERPL-SCNC: 32 MMOL/L (ref 21–32)
CREAT SERPL-MCNC: 0.38 MG/DL (ref 0.8–1.5)
DIFFERENTIAL METHOD BLD: ABNORMAL
EOSINOPHIL # BLD: 0.1 K/UL (ref 0–0.8)
EOSINOPHIL NFR BLD: 3 % (ref 0.5–7.8)
ERYTHROCYTE [DISTWIDTH] IN BLOOD BY AUTOMATED COUNT: 20.4 % (ref 11.9–14.6)
GLUCOSE SERPL-MCNC: 119 MG/DL (ref 65–100)
HCT VFR BLD AUTO: 29.3 % (ref 41.1–50.3)
HGB BLD-MCNC: 9 G/DL (ref 13.6–17.2)
IMM GRANULOCYTES # BLD AUTO: 0.1 K/UL (ref 0–0.5)
IMM GRANULOCYTES NFR BLD AUTO: 1 % (ref 0–5)
LYMPHOCYTES # BLD: 1 K/UL (ref 0.5–4.6)
LYMPHOCYTES NFR BLD: 19 % (ref 13–44)
MCH RBC QN AUTO: 28.3 PG (ref 26.1–32.9)
MCHC RBC AUTO-ENTMCNC: 30.7 G/DL (ref 31.4–35)
MCV RBC AUTO: 92.1 FL (ref 79.6–97.8)
MONOCYTES # BLD: 0.5 K/UL (ref 0.1–1.3)
MONOCYTES NFR BLD: 9 % (ref 4–12)
NEUTS SEG # BLD: 3.6 K/UL (ref 1.7–8.2)
NEUTS SEG NFR BLD: 68 % (ref 43–78)
NRBC # BLD: 0 K/UL (ref 0–0.2)
PLATELET # BLD AUTO: 210 K/UL (ref 150–450)
PMV BLD AUTO: 9.1 FL (ref 9.4–12.3)
POTASSIUM SERPL-SCNC: 4 MMOL/L (ref 3.5–5.1)
RBC # BLD AUTO: 3.18 M/UL (ref 4.23–5.6)
SODIUM SERPL-SCNC: 130 MMOL/L (ref 136–145)
WBC # BLD AUTO: 5.4 K/UL (ref 4.3–11.1)

## 2019-09-01 PROCEDURE — 80048 BASIC METABOLIC PNL TOTAL CA: CPT

## 2019-09-01 PROCEDURE — 94640 AIRWAY INHALATION TREATMENT: CPT

## 2019-09-01 PROCEDURE — 36415 COLL VENOUS BLD VENIPUNCTURE: CPT

## 2019-09-01 PROCEDURE — 74011250637 HC RX REV CODE- 250/637: Performed by: NURSE PRACTITIONER

## 2019-09-01 PROCEDURE — 74011250637 HC RX REV CODE- 250/637: Performed by: INTERNAL MEDICINE

## 2019-09-01 PROCEDURE — 74011000258 HC RX REV CODE- 258: Performed by: INTERNAL MEDICINE

## 2019-09-01 PROCEDURE — 74011250636 HC RX REV CODE- 250/636: Performed by: INTERNAL MEDICINE

## 2019-09-01 PROCEDURE — 85025 COMPLETE CBC W/AUTO DIFF WBC: CPT

## 2019-09-01 PROCEDURE — 74011250636 HC RX REV CODE- 250/636: Performed by: NURSE PRACTITIONER

## 2019-09-01 PROCEDURE — 74011000250 HC RX REV CODE- 250: Performed by: NURSE PRACTITIONER

## 2019-09-01 PROCEDURE — 65660000000 HC RM CCU STEPDOWN

## 2019-09-01 PROCEDURE — 92610 EVALUATE SWALLOWING FUNCTION: CPT

## 2019-09-01 PROCEDURE — 94760 N-INVAS EAR/PLS OXIMETRY 1: CPT

## 2019-09-01 PROCEDURE — 97161 PT EVAL LOW COMPLEX 20 MIN: CPT

## 2019-09-01 PROCEDURE — 77010033678 HC OXYGEN DAILY

## 2019-09-01 RX ORDER — FUROSEMIDE 10 MG/ML
40 INJECTION INTRAMUSCULAR; INTRAVENOUS DAILY
Status: DISCONTINUED | OUTPATIENT
Start: 2019-09-02 | End: 2019-09-03

## 2019-09-01 RX ADMIN — ENOXAPARIN SODIUM 40 MG: 40 INJECTION SUBCUTANEOUS at 20:28

## 2019-09-01 RX ADMIN — FAMOTIDINE 20 MG: 20 TABLET ORAL at 08:30

## 2019-09-01 RX ADMIN — FAMOTIDINE 20 MG: 20 TABLET ORAL at 17:14

## 2019-09-01 RX ADMIN — ASPIRIN 81 MG 81 MG: 81 TABLET ORAL at 08:30

## 2019-09-01 RX ADMIN — IPRATROPIUM BROMIDE AND ALBUTEROL SULFATE 3 ML: .5; 3 SOLUTION RESPIRATORY (INHALATION) at 19:23

## 2019-09-01 RX ADMIN — GUAIFENESIN 1200 MG: 600 TABLET, EXTENDED RELEASE ORAL at 08:30

## 2019-09-01 RX ADMIN — DIVALPROEX SODIUM 250 MG: 250 TABLET, DELAYED RELEASE ORAL at 21:01

## 2019-09-01 RX ADMIN — IPRATROPIUM BROMIDE AND ALBUTEROL SULFATE 3 ML: .5; 3 SOLUTION RESPIRATORY (INHALATION) at 11:14

## 2019-09-01 RX ADMIN — IPRATROPIUM BROMIDE AND ALBUTEROL SULFATE 3 ML: .5; 3 SOLUTION RESPIRATORY (INHALATION) at 22:36

## 2019-09-01 RX ADMIN — BISACODYL 10 MG: 10 SUPPOSITORY RECTAL at 20:28

## 2019-09-01 RX ADMIN — IPRATROPIUM BROMIDE AND ALBUTEROL SULFATE 3 ML: .5; 3 SOLUTION RESPIRATORY (INHALATION) at 04:44

## 2019-09-01 RX ADMIN — IPRATROPIUM BROMIDE AND ALBUTEROL SULFATE 3 ML: .5; 3 SOLUTION RESPIRATORY (INHALATION) at 00:50

## 2019-09-01 RX ADMIN — IPRATROPIUM BROMIDE AND ALBUTEROL SULFATE 3 ML: .5; 3 SOLUTION RESPIRATORY (INHALATION) at 15:12

## 2019-09-01 RX ADMIN — METOPROLOL TARTRATE 25 MG: 25 TABLET ORAL at 17:14

## 2019-09-01 RX ADMIN — LORAZEPAM 1 MG: 0.5 TABLET ORAL at 00:47

## 2019-09-01 RX ADMIN — IPRATROPIUM BROMIDE AND ALBUTEROL SULFATE 3 ML: .5; 3 SOLUTION RESPIRATORY (INHALATION) at 07:02

## 2019-09-01 RX ADMIN — Medication 10 ML: at 14:00

## 2019-09-01 RX ADMIN — Medication 10 ML: at 06:09

## 2019-09-01 RX ADMIN — SERTRALINE HYDROCHLORIDE 50 MG: 50 TABLET ORAL at 08:30

## 2019-09-01 RX ADMIN — GUAIFENESIN 1200 MG: 600 TABLET, EXTENDED RELEASE ORAL at 20:27

## 2019-09-01 RX ADMIN — FUROSEMIDE 40 MG: 10 INJECTION, SOLUTION INTRAMUSCULAR; INTRAVENOUS at 08:29

## 2019-09-01 RX ADMIN — CEFTRIAXONE 1 G: 1 INJECTION, POWDER, FOR SOLUTION INTRAMUSCULAR; INTRAVENOUS at 17:15

## 2019-09-01 RX ADMIN — Medication 10 ML: at 21:02

## 2019-09-01 RX ADMIN — LORAZEPAM 1 MG: 0.5 TABLET ORAL at 13:59

## 2019-09-01 RX ADMIN — POLYETHYLENE GLYCOL 3350 17 G: 17 POWDER, FOR SOLUTION ORAL at 06:06

## 2019-09-01 RX ADMIN — HYDROCODONE BITARTRATE AND ACETAMINOPHEN 1 TABLET: 5; 325 TABLET ORAL at 13:59

## 2019-09-01 RX ADMIN — OLANZAPINE 2.5 MG: 2.5 TABLET, FILM COATED ORAL at 21:01

## 2019-09-01 RX ADMIN — METOPROLOL TARTRATE 25 MG: 25 TABLET ORAL at 08:29

## 2019-09-01 NOTE — PROGRESS NOTES
END OF SHIFT NOTE:    INTAKE/OUTPUT  08/31 0701 - 09/01 0700  In: 1020 [P.O.:1020]  Out: 9786 [Urine:2225]  Voiding: YES  Catheter: Suprapubic  Drain:   Supra-pubic Catheter (Active)   Indications for Use Chronic urinary retention/bladder outlet obstruction 9/1/2019 12:48 AM   Status Draining;Patent 9/1/2019 12:48 AM   Site Condition No abnormalities 9/1/2019 12:48 AM   Castillo Catheter Care Completed No 9/1/2019 12:48 AM   Drainage Tube Clipped to Bed Yes 9/1/2019 12:48 AM   Catheter Secured to Thigh No 9/1/2019 12:48 AM   Tamper Seal Intact No 9/1/2019 12:48 AM   Bag Below Bladder/Not on Floor Yes 9/1/2019 12:48 AM   Lack of Dependent Loop in Tubing Yes 9/1/2019 12:48 AM   Drainage Bag Less Than Half Full Yes 9/1/2019 12:48 AM   Sterile Solution Used for  Irrigation N/A 9/1/2019 12:48 AM   Urine Output (mL) 300 ml 9/1/2019  4:52 AM               Flatus: Patient does have flatus present. Stool:  0 occurrences. Characteristics:  Stool Assessment  Stool Color: Brown  Stool Appearance: Soft  Stool Amount: Small  Stool Source/Status: Rectum    Emesis: 0 occurrences. Characteristics:        VITAL SIGNS  Patient Vitals for the past 12 hrs:   Temp Pulse Resp BP SpO2   09/01/19 0451 97.5 °F (36.4 °C) (!) 107 25 155/70 97 %   09/01/19 0445     100 %   09/01/19 0050     95 %   09/01/19 0027 97.3 °F (36.3 °C) 93 20 104/63 94 %   08/31/19 2138 97.3 °F (36.3 °C) 99 20 111/64 92 %   08/31/19 1950     93 %   08/31/19 1948  (!) 105          Pain Assessment  Pain Intensity 1: 0 (08/31/19 1553)        Patient Stated Pain Goal: 0    Ambulating  No    Shift report given to oncoming nurse at the bedside.     Ximena Lerma RN
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
## 2019-09-01 NOTE — PROGRESS NOTES
SPEECH LANGUAGE PATHOLOGY: DYSPHAGIA- Initial Assessment and Discharge    NAME/AGE/GENDER: Alejandra Lagos is a 70 y.o. male  DATE: 9/1/2019  PRIMARY DIAGNOSIS: Acute respiratory failure ([address] [J96.00]       ICD-10: Treatment Diagnosis: R13.12 Dysphagia, Oropharyngeal Phase    INTERDISCIPLINARY COLLABORATION: Registered Nurse  PRECAUTIONS/ALLERGIES: Bactrim [sulfamethoprim ds]; Benadryl [diphenhydramine hcl]; Ceftin [cefuroxime axetil]; Cefuroxime; Barbra Kole; Levaquin [levofloxacin]; Phenergan [promethazine]; Pyridium [phenazopyridine]; and Jessica Conway   Mr. Maryuri Martinez was seen this morning, reclined in bed, and watching TV. Pt awake/alert and agreeable to SLP assessment. Head of bed raised slightly with pt's permission. Pt known to SLP team from swallow assessment on 6/20/18. At that time, pt's swallow was deemed within normal limits and a regular diet with thin liquids was recommended. Diet Prior to Evaluation: [facility] soft solids/thin liquids     History of Present Injury/Illness: Mr. Maryuri Martinez  has a past medical history of Acute pain due to trauma, Anxiety, Bipolar 1 disorder ([address] Brief psychotic disorder, Chronic UTI ( ), Complicated UTI (urinary tract infection) ( ), Decubitus ulcer, Encephalopathy ( ), GERD (gastroesophageal reflux disease), Hereditary and idiopathic neuropathy, History of femur fracture, History of kidney stones, History of rectal sphincterotomy, urinary frequency, Hyperlipidemia, Hyperlipidemia, Hypertensive retinopathy, Hyponatremia, Kidney stone, Major depressive disorder, Muscle weakness, Neurogenic bladder, NOS, Obstructive and reflux uropathy, Paranoid schizophrenia ([address] Psychiatric disorder, Quadriplegia ([address] (at age 47- per nursing home records), Scrotal abscess, Sepsis ( ), Suprapubic catheter ([address] and Thyroid disease. He also has no past medical history of Dementia, Endocrine disease, or Sleep disorder. [place] Gu  He also  has a past surgical history that includes hx urological; hx other surgical; and pr removal with insertion of suprapubic catheter. Previous Dysphagia: NONE REPORTED    Problem List:  (Impairments causing functional limitations):  Oropharyngeal dysphagia - none noted by SLP    Orientation:   Person  Place  Situation     Pain: Pain Scale 1: Numeric (0 - 10)  Pain Intensity 1: 0         OBJECTIVE   Oral Motor Assessment:  Labial: No impairment  Dentition: Intact  Oral Hygiene: Adequate  Lingual: No impairment     Swallow assessment:   Patient presented with po trials of thin liquids and mech soft/mixed consistency solids. Pt with timely mastication of soft solids and complete oral clearance noted with no signs of airway compromise. Pt taking serial swallows of thin liquids via straw with no overt signs/symptoms of airway compromise as evidenced by timely swallows, no cough response, and no change in clear/dry vocal quality. Pt was noted to cough and expectorate moderate sized mucus plug prior to po trials being provided. Pt hitting abdomen moderately while coughing to assist with expelling mucus plug. Pt stating he was unable to eat solid foods \"off and on\" for six months, approximately 3-4 months ago. Pt was unable to elaborate or explain any further, other than he could not swallow solids. Pt reports this problem has resolved and he is now able to eat normally. ASSESSMENT   Patient presents with an essentially normal oropharyngeal swallow function characterized by no signs of airway compromise with soft solids or thin liquids. Recommend continue [facility] soft diet as ordered. SLP will sign off at this time. Thank you for this referral.           Tool Used: Dysphagia Outcome and Severity Scale (HEATHER)    Score Comments   Normal Diet  ? 7 With no strategies or extra time needed   Functional Swallow  ? 6 May have mild oral or pharyngeal delay   Mild Dysphagia  ? 5 Which may require one diet consistency restricted    Mild-Moderate Dysphagia  ?  4 With 1-2 diet consistencies restricted   Moderate Dysphagia  ? 3 With 2 or more diet consistencies restricted   Moderate-Severe Dysphagia  ? 2 With partial PO strategies (trials with ST only)   Severe Dysphagia  ? 1 With inability to tolerate any PO safely      Score:  Initial: 7 Most Recent: x (Date 09/01/19 )   Interpretation of Tool: The Dysphagia Outcome and Severity Scale (HEATHER) is a simple, easy-to-use, 7-point scale developed to systematically rate the functional severity of dysphagia based on objective assessment and make recommendations for diet level, independence level, and type of nutrition. Current Medications:   No current facility-administered medications on file prior to encounter. Current Outpatient Medications on File Prior to Encounter   Medication Sig Dispense Refill    aluminum-magnesium hydroxide (MAALOX) 200-200 mg/5 mL suspension Take 30 mL by mouth every six (6) hours as needed for Indigestion. cholecalciferol (VITAMIN D3) 50,000 unit capsule Take 50,000 Units by mouth every seven (7) days. methenamine hippurate (HIPREX) 1 gram tablet Take 1 g by mouth two (2) times daily (with meals). metoprolol tartrate (LOPRESSOR) 25 mg tablet Take 25 mg by mouth two (2) times a day. magnesium hydroxide (CARPIO MILK OF MAGNESIA) 400 mg/5 mL suspension Take 30 mL by mouth daily as needed for Constipation. ondansetron hcl (ZOFRAN) 4 mg tablet Take 4 mg by mouth every six (6) hours as needed for Nausea. divalproex DR (DEPAKOTE) 250 mg tablet Take 250 mg by mouth nightly. polyvinyl alcohol-povidon,PF, (REFRESH CLASSIC) 1.4-0.6 % ophthalmic solution Administer 1-2 Drops to both eyes as needed. propantheline (PROBANTHINE) 15 mg tablet Take 15 mg by mouth two (2) times a day.      naloxegol (MOVANTIK) 25 mg tab tablet Take 1 Tab by mouth Daily (before breakfast). 30 Tab 0    bisacodyl (DULCOLAX) 10 mg suppository Insert 10 mg into rectum every other day. cyanocobalamin 1,000 mcg tablet Take 1,000 mcg by mouth every morning. polyethylene glycol (MIRALAX) 17 gram packet Take 17 g by mouth every morning. SERTRALINE HCL (ZOLOFT PO) Take 50 mg by mouth every morning.      multivitamin (ONE A DAY) tablet Take 1 Tab by mouth every morning. famotidine (PEPCID) 20 mg tablet Take 20 mg by mouth two (2) times a day. acetaminophen (TYLENOL) 500 mg tablet Take 650 mg by mouth every six (6) hours as needed for Pain. albuterol (PROVENTIL HFA, VENTOLIN HFA, PROAIR HFA) 90 mcg/actuation inhaler Take 2 Puffs by inhalation four (4) times daily. PLAN    FREQUENCY/DURATION: No further speech therapy indicated at this time as oropharyngeal swallow function is within normal limits. - Recommendations for next treatment session: No additional speech therapy indicated at this time. RECOMMENDATIONS   DIET:   continue prescribed diet    MEDICATIONS: Crushed in puree     ASPIRATION PRECAUTIONS  Slow rate of intake  Small bites/sips  Upright at 90 degrees during meal     COMPENSATORY STRATEGIES/MODIFICATIONS  Alternate liquids/solids  Small sips and bites  1:1 assistance with meals      EDUCATION:  Recommendations discussed with Patient     RECOMMENDATIONS for CONTINUED SPEECH THERAPY:   No further speech therapy indicated at this time.        SAFETY:  After treatment position/precautions:  Upright in bed  Call light within reach      Total Treatment Duration:   Time In: 1118  Time Out: [address], [address] [place], CCC-SLP
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
## 2019-09-02 ENCOUNTER — APPOINTMENT (OUTPATIENT)
Dept: GENERAL RADIOLOGY | Age: 79
DRG: 291 | End: 2019-09-02
Attending: INTERNAL MEDICINE
Payer: MEDICARE

## 2019-09-02 LAB
ANION GAP SERPL CALC-SCNC: 4 MMOL/L (ref 7–16)
BASOPHILS # BLD: 0 K/UL (ref 0–0.2)
BASOPHILS NFR BLD: 1 % (ref 0–2)
BUN SERPL-MCNC: 8 MG/DL (ref 8–23)
CALCIUM SERPL-MCNC: 7.7 MG/DL (ref 8.3–10.4)
CHLORIDE SERPL-SCNC: 97 MMOL/L (ref 98–107)
CO2 SERPL-SCNC: 34 MMOL/L (ref 21–32)
CREAT SERPL-MCNC: 0.46 MG/DL (ref 0.8–1.5)
DIFFERENTIAL METHOD BLD: ABNORMAL
EOSINOPHIL # BLD: 0.2 K/UL (ref 0–0.8)
EOSINOPHIL NFR BLD: 4 % (ref 0.5–7.8)
ERYTHROCYTE [DISTWIDTH] IN BLOOD BY AUTOMATED COUNT: 20.8 % (ref 11.9–14.6)
GLUCOSE SERPL-MCNC: 110 MG/DL (ref 65–100)
HCT VFR BLD AUTO: 27.5 % (ref 41.1–50.3)
HGB BLD-MCNC: 8.2 G/DL (ref 13.6–17.2)
IMM GRANULOCYTES # BLD AUTO: 0.1 K/UL (ref 0–0.5)
IMM GRANULOCYTES NFR BLD AUTO: 1 % (ref 0–5)
LYMPHOCYTES # BLD: 1.1 K/UL (ref 0.5–4.6)
LYMPHOCYTES NFR BLD: 22 % (ref 13–44)
MCH RBC QN AUTO: 27.6 PG (ref 26.1–32.9)
MCHC RBC AUTO-ENTMCNC: 29.8 G/DL (ref 31.4–35)
MCV RBC AUTO: 92.6 FL (ref 79.6–97.8)
MONOCYTES # BLD: 0.5 K/UL (ref 0.1–1.3)
MONOCYTES NFR BLD: 10 % (ref 4–12)
NEUTS SEG # BLD: 3.3 K/UL (ref 1.7–8.2)
NEUTS SEG NFR BLD: 63 % (ref 43–78)
NRBC # BLD: 0 K/UL (ref 0–0.2)
PLATELET # BLD AUTO: 225 K/UL (ref 150–450)
PMV BLD AUTO: 8.7 FL (ref 9.4–12.3)
POTASSIUM SERPL-SCNC: 3.9 MMOL/L (ref 3.5–5.1)
RBC # BLD AUTO: 2.97 M/UL (ref 4.23–5.6)
SODIUM SERPL-SCNC: 135 MMOL/L (ref 136–145)
WBC # BLD AUTO: 5.2 K/UL (ref 4.3–11.1)

## 2019-09-02 PROCEDURE — 74011000250 HC RX REV CODE- 250: Performed by: NURSE PRACTITIONER

## 2019-09-02 PROCEDURE — 71045 X-RAY EXAM CHEST 1 VIEW: CPT

## 2019-09-02 PROCEDURE — 74011250636 HC RX REV CODE- 250/636: Performed by: INTERNAL MEDICINE

## 2019-09-02 PROCEDURE — 65660000000 HC RM CCU STEPDOWN

## 2019-09-02 PROCEDURE — 94760 N-INVAS EAR/PLS OXIMETRY 1: CPT

## 2019-09-02 PROCEDURE — 74011250636 HC RX REV CODE- 250/636: Performed by: NURSE PRACTITIONER

## 2019-09-02 PROCEDURE — 94640 AIRWAY INHALATION TREATMENT: CPT

## 2019-09-02 PROCEDURE — 74011250637 HC RX REV CODE- 250/637: Performed by: NURSE PRACTITIONER

## 2019-09-02 PROCEDURE — 74011000258 HC RX REV CODE- 258: Performed by: NURSE PRACTITIONER

## 2019-09-02 PROCEDURE — 80048 BASIC METABOLIC PNL TOTAL CA: CPT

## 2019-09-02 PROCEDURE — 36415 COLL VENOUS BLD VENIPUNCTURE: CPT

## 2019-09-02 PROCEDURE — 85025 COMPLETE CBC W/AUTO DIFF WBC: CPT

## 2019-09-02 PROCEDURE — 77030019605

## 2019-09-02 PROCEDURE — 74011250637 HC RX REV CODE- 250/637: Performed by: INTERNAL MEDICINE

## 2019-09-02 RX ADMIN — ASPIRIN 81 MG 81 MG: 81 TABLET ORAL at 08:11

## 2019-09-02 RX ADMIN — DIVALPROEX SODIUM 250 MG: 250 TABLET, DELAYED RELEASE ORAL at 19:54

## 2019-09-02 RX ADMIN — IPRATROPIUM BROMIDE AND ALBUTEROL SULFATE 3 ML: .5; 3 SOLUTION RESPIRATORY (INHALATION) at 04:07

## 2019-09-02 RX ADMIN — CEFEPIME HYDROCHLORIDE 2 G: 2 INJECTION, POWDER, FOR SOLUTION INTRAVENOUS at 08:11

## 2019-09-02 RX ADMIN — IPRATROPIUM BROMIDE AND ALBUTEROL SULFATE 3 ML: .5; 3 SOLUTION RESPIRATORY (INHALATION) at 19:34

## 2019-09-02 RX ADMIN — SERTRALINE HYDROCHLORIDE 50 MG: 50 TABLET ORAL at 08:11

## 2019-09-02 RX ADMIN — METOPROLOL TARTRATE 25 MG: 25 TABLET ORAL at 08:11

## 2019-09-02 RX ADMIN — POLYETHYLENE GLYCOL 3350 17 G: 17 POWDER, FOR SOLUTION ORAL at 06:10

## 2019-09-02 RX ADMIN — IPRATROPIUM BROMIDE AND ALBUTEROL SULFATE 3 ML: .5; 3 SOLUTION RESPIRATORY (INHALATION) at 08:35

## 2019-09-02 RX ADMIN — IPRATROPIUM BROMIDE AND ALBUTEROL SULFATE 3 ML: .5; 3 SOLUTION RESPIRATORY (INHALATION) at 16:17

## 2019-09-02 RX ADMIN — HYDROCODONE BITARTRATE AND ACETAMINOPHEN 1 TABLET: 5; 325 TABLET ORAL at 06:08

## 2019-09-02 RX ADMIN — FAMOTIDINE 20 MG: 20 TABLET ORAL at 17:59

## 2019-09-02 RX ADMIN — ENOXAPARIN SODIUM 40 MG: 40 INJECTION SUBCUTANEOUS at 19:53

## 2019-09-02 RX ADMIN — IPRATROPIUM BROMIDE AND ALBUTEROL SULFATE 3 ML: .5; 3 SOLUTION RESPIRATORY (INHALATION) at 12:15

## 2019-09-02 RX ADMIN — HYDROCODONE BITARTRATE AND ACETAMINOPHEN 1 TABLET: 5; 325 TABLET ORAL at 19:52

## 2019-09-02 RX ADMIN — OLANZAPINE 2.5 MG: 2.5 TABLET, FILM COATED ORAL at 19:55

## 2019-09-02 RX ADMIN — HYDROCODONE BITARTRATE AND ACETAMINOPHEN 1 TABLET: 5; 325 TABLET ORAL at 00:15

## 2019-09-02 RX ADMIN — METOPROLOL TARTRATE 25 MG: 25 TABLET ORAL at 17:59

## 2019-09-02 RX ADMIN — Medication 10 ML: at 06:10

## 2019-09-02 RX ADMIN — FUROSEMIDE 40 MG: 10 INJECTION, SOLUTION INTRAMUSCULAR; INTRAVENOUS at 08:10

## 2019-09-02 RX ADMIN — HYDROCODONE BITARTRATE AND ACETAMINOPHEN 1 TABLET: 5; 325 TABLET ORAL at 16:09

## 2019-09-02 RX ADMIN — FAMOTIDINE 20 MG: 20 TABLET ORAL at 08:11

## 2019-09-02 RX ADMIN — GUAIFENESIN 1200 MG: 600 TABLET, EXTENDED RELEASE ORAL at 08:10

## 2019-09-02 RX ADMIN — Medication 10 ML: at 17:57

## 2019-09-02 RX ADMIN — CEFEPIME HYDROCHLORIDE 2 G: 2 INJECTION, POWDER, FOR SOLUTION INTRAVENOUS at 17:58

## 2019-09-02 RX ADMIN — GUAIFENESIN 1200 MG: 600 TABLET, EXTENDED RELEASE ORAL at 19:55

## 2019-09-02 RX ADMIN — HYDROCODONE BITARTRATE AND ACETAMINOPHEN 1 TABLET: 5; 325 TABLET ORAL at 10:16

## 2019-09-02 NOTE — PROGRESS NOTES
END OF SHIFT NOTE:    INTAKE/OUTPUT  09/01 0701 - 09/02 0700  In: 470 [P.O.:470]  Out: 3063 [Urine:2675]  Voiding: YES  Catheter: Suprapubic  Drain:   Supra-pubic Catheter (Active)   Indications for Use Chronic urinary retention/bladder outlet obstruction 9/2/2019 12:18 AM   Status Draining;Patent 9/2/2019 12:18 AM   Site Condition No abnormalities 9/2/2019 12:18 AM   Castillo Catheter Care Completed No 9/2/2019 12:18 AM   Drainage Tube Clipped to Bed Yes 9/2/2019 12:18 AM   Catheter Secured to Thigh No 9/2/2019 12:18 AM   Tamper Seal Intact No 9/2/2019 12:18 AM   Bag Below Bladder/Not on Floor Yes 9/2/2019 12:18 AM   Lack of Dependent Loop in Tubing Yes 9/2/2019 12:18 AM   Drainage Bag Less Than Half Full Yes 9/2/2019 12:18 AM   Sterile Solution Used for  Irrigation N/A 9/2/2019 12:18 AM   Urine Output (mL) 650 ml 9/2/2019  6:12 AM               Flatus: Patient does have flatus present. Stool:  0 occurrences. Characteristics:  Stool Assessment  Stool Color: Brown  Stool Appearance: Soft  Stool Amount: Small  Stool Source/Status: Rectum    Emesis: 0 occurrences. Characteristics:        VITAL SIGNS  Patient Vitals for the past 12 hrs:   Temp Pulse Resp BP SpO2   09/02/19 0612  (!) 101      09/02/19 0604 97.4 °F (36.3 °C) (!) 115 20 131/69 90 %   09/02/19 0407     99 %   09/01/19 2349 97.4 °F (36.3 °C) (!) 105 20 107/61 96 %   09/01/19 2239     98 %   09/01/19 2031 97.3 °F (36.3 °C) 85 20 93/52 100 %   09/01/19 1923     97 %       Pain Assessment  Pain Intensity 1: 8 (09/02/19 0608)  Pain Location 1: Elbow  Pain Intervention(s) 1: Medication (see MAR)  Patient Stated Pain Goal: 0    Ambulating  No    Shift report given to oncoming nurse at the bedside.     Boo Keith RN
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
## 2019-09-02 NOTE — WOUND CARE
Patient seen for left buttock and gluteal cleft fold (mid) moisture/friction damage. Shallow ulceration to both sites. Pink with moisture redness/damage surrounding both open areas. fold linear open area 1.8cm, buttock 0.5x2.0 cm. Silicone foam and zinc paste in use already. Recommend this to be continued and to remove brief while in the bed and keep turned side to side to allow air flow. Wound team will monitor, please call if needed.

## 2019-09-02 NOTE — PROGRESS NOTES
Pt is known to CM from previous admissions. Per chart review he has bee a long term care resident at [address] [place] [address] since 2015. Pt has a 10 day medicaid bed hold that is through 9/9/2019. Will follow pt plan of care and assist with his return to [facility] as discharge orders received. Care Management Interventions  PCP Verified by CM: (followed by MD at [facility])  Transition of Care Consult (CM Consult): SNF(Pt is a long term care resident at [address] [place] [address]. Per chart review he has been a resident there since about 2015.   He has a 10 day medicaid bedhold that will be through 9/9/2019.  )  Partner SNF: Yes  Discharge Durable Medical Equipment: No(any needed DME will be provided by the SNF if orders recieved)  Physical Therapy Consult: Yes  Occupational Therapy Consult: Yes  Speech Therapy Consult: Yes  Current Support Network: Nursing Facility(Pt is a long tern care resident at [address] [place] [address] where he has lived since 2015.)  Confirm Follow Up Transport: Other (see comment)(transport)  Plan discussed with Pt/Family/Caregiver: Yes  Freedom of Choice Offered: Yes   Resource Information Provided?: No  Discharge Location  Discharge Placement: Rehab Unit Subacute(Pt will return to his long term care medicaid bed at [facility] at discharge.)

## 2019-09-03 ENCOUNTER — APPOINTMENT (OUTPATIENT)
Dept: GENERAL RADIOLOGY | Age: 79
DRG: 291 | End: 2019-09-03
Attending: NURSE PRACTITIONER
Payer: MEDICARE

## 2019-09-03 LAB
ANION GAP SERPL CALC-SCNC: 2 MMOL/L (ref 7–16)
BASOPHILS # BLD: 0 K/UL (ref 0–0.2)
BASOPHILS NFR BLD: 1 % (ref 0–2)
BUN SERPL-MCNC: 11 MG/DL (ref 8–23)
CALCIUM SERPL-MCNC: 8.2 MG/DL (ref 8.3–10.4)
CHLORIDE SERPL-SCNC: 99 MMOL/L (ref 98–107)
CO2 SERPL-SCNC: 35 MMOL/L (ref 21–32)
CREAT SERPL-MCNC: 0.53 MG/DL (ref 0.8–1.5)
DIFFERENTIAL METHOD BLD: ABNORMAL
EOSINOPHIL # BLD: 0.3 K/UL (ref 0–0.8)
EOSINOPHIL NFR BLD: 5 % (ref 0.5–7.8)
ERYTHROCYTE [DISTWIDTH] IN BLOOD BY AUTOMATED COUNT: 21.1 % (ref 11.9–14.6)
GLUCOSE SERPL-MCNC: 100 MG/DL (ref 65–100)
HCT VFR BLD AUTO: 31.4 % (ref 41.1–50.3)
HGB BLD-MCNC: 9.3 G/DL (ref 13.6–17.2)
IMM GRANULOCYTES # BLD AUTO: 0.1 K/UL (ref 0–0.5)
IMM GRANULOCYTES NFR BLD AUTO: 1 % (ref 0–5)
LYMPHOCYTES # BLD: 1.1 K/UL (ref 0.5–4.6)
LYMPHOCYTES NFR BLD: 19 % (ref 13–44)
MCH RBC QN AUTO: 27.7 PG (ref 26.1–32.9)
MCHC RBC AUTO-ENTMCNC: 29.6 G/DL (ref 31.4–35)
MCV RBC AUTO: 93.5 FL (ref 79.6–97.8)
MONOCYTES # BLD: 0.6 K/UL (ref 0.1–1.3)
MONOCYTES NFR BLD: 10 % (ref 4–12)
NEUTS SEG # BLD: 3.9 K/UL (ref 1.7–8.2)
NEUTS SEG NFR BLD: 64 % (ref 43–78)
NRBC # BLD: 0 K/UL (ref 0–0.2)
PLATELET # BLD AUTO: 268 K/UL (ref 150–450)
PMV BLD AUTO: 8.3 FL (ref 9.4–12.3)
POTASSIUM SERPL-SCNC: 4.1 MMOL/L (ref 3.5–5.1)
RBC # BLD AUTO: 3.36 M/UL (ref 4.23–5.6)
SODIUM SERPL-SCNC: 136 MMOL/L (ref 136–145)
WBC # BLD AUTO: 6.1 K/UL (ref 4.3–11.1)

## 2019-09-03 PROCEDURE — 94760 N-INVAS EAR/PLS OXIMETRY 1: CPT

## 2019-09-03 PROCEDURE — C1751 CATH, INF, PER/CENT/MIDLINE: HCPCS

## 2019-09-03 PROCEDURE — 76937 US GUIDE VASCULAR ACCESS: CPT

## 2019-09-03 PROCEDURE — 74011250637 HC RX REV CODE- 250/637: Performed by: NURSE PRACTITIONER

## 2019-09-03 PROCEDURE — 74011250637 HC RX REV CODE- 250/637: Performed by: INTERNAL MEDICINE

## 2019-09-03 PROCEDURE — 74011000258 HC RX REV CODE- 258: Performed by: NURSE PRACTITIONER

## 2019-09-03 PROCEDURE — 77030018786 HC NDL GD F/USND BARD -B

## 2019-09-03 PROCEDURE — 85025 COMPLETE CBC W/AUTO DIFF WBC: CPT

## 2019-09-03 PROCEDURE — 71045 X-RAY EXAM CHEST 1 VIEW: CPT

## 2019-09-03 PROCEDURE — 77010033678 HC OXYGEN DAILY

## 2019-09-03 PROCEDURE — 65660000000 HC RM CCU STEPDOWN

## 2019-09-03 PROCEDURE — 94640 AIRWAY INHALATION TREATMENT: CPT

## 2019-09-03 PROCEDURE — 77030019605

## 2019-09-03 PROCEDURE — 36415 COLL VENOUS BLD VENIPUNCTURE: CPT

## 2019-09-03 PROCEDURE — 77030018719 HC DRSG PTCH ANTIMIC J&J -A

## 2019-09-03 PROCEDURE — 74011250636 HC RX REV CODE- 250/636: Performed by: NURSE PRACTITIONER

## 2019-09-03 PROCEDURE — 74011000250 HC RX REV CODE- 250: Performed by: NURSE PRACTITIONER

## 2019-09-03 PROCEDURE — 80048 BASIC METABOLIC PNL TOTAL CA: CPT

## 2019-09-03 RX ORDER — SENNOSIDES 8.6 MG/1
2 TABLET ORAL DAILY
COMMUNITY
End: 2019-11-06

## 2019-09-03 RX ORDER — SODIUM CHLORIDE 0.9 % (FLUSH) 0.9 %
10 SYRINGE (ML) INJECTION AS NEEDED
Status: DISCONTINUED | OUTPATIENT
Start: 2019-09-03 | End: 2019-09-05 | Stop reason: HOSPADM

## 2019-09-03 RX ORDER — NYSTATIN 100000 [USP'U]/G
POWDER TOPICAL 2 TIMES DAILY
COMMUNITY

## 2019-09-03 RX ORDER — LANOLIN ALCOHOL/MO/W.PET/CERES
400 CREAM (GRAM) TOPICAL DAILY
Status: ON HOLD | COMMUNITY
End: 2019-11-01

## 2019-09-03 RX ORDER — TRAZODONE HYDROCHLORIDE 50 MG/1
50 TABLET ORAL
Status: ON HOLD | COMMUNITY
End: 2019-11-01

## 2019-09-03 RX ORDER — FERROUS SULFATE 324(65)MG
325 TABLET, DELAYED RELEASE (ENTERIC COATED) ORAL EVERY OTHER DAY
Status: ON HOLD | COMMUNITY
End: 2019-11-01

## 2019-09-03 RX ORDER — DOCUSATE SODIUM 100 MG/1
100 CAPSULE, LIQUID FILLED ORAL 2 TIMES DAILY
Status: ON HOLD | COMMUNITY
End: 2019-11-01

## 2019-09-03 RX ORDER — FUROSEMIDE 20 MG/1
TABLET ORAL DAILY
Status: ON HOLD | COMMUNITY
End: 2019-09-05 | Stop reason: SDUPTHER

## 2019-09-03 RX ORDER — FUROSEMIDE 40 MG/1
40 TABLET ORAL DAILY
Status: DISCONTINUED | OUTPATIENT
Start: 2019-09-04 | End: 2019-09-05 | Stop reason: HOSPADM

## 2019-09-03 RX ORDER — IPRATROPIUM BROMIDE AND ALBUTEROL SULFATE 2.5; .5 MG/3ML; MG/3ML
3 SOLUTION RESPIRATORY (INHALATION)
COMMUNITY

## 2019-09-03 RX ORDER — LANOLIN ALCOHOL/MO/W.PET/CERES
3 CREAM (GRAM) TOPICAL
Status: ON HOLD | COMMUNITY
End: 2019-11-01

## 2019-09-03 RX ORDER — OLANZAPINE 2.5 MG/1
2.5 TABLET ORAL
COMMUNITY

## 2019-09-03 RX ORDER — ASPIRIN 81 MG/1
81 TABLET ORAL DAILY
COMMUNITY

## 2019-09-03 RX ORDER — GLUCOSAMINE SULFATE 1500 MG
2000 POWDER IN PACKET (EA) ORAL DAILY
COMMUNITY
End: 2019-11-06

## 2019-09-03 RX ORDER — SODIUM CHLORIDE 0.9 % (FLUSH) 0.9 %
10 SYRINGE (ML) INJECTION EVERY 8 HOURS
Status: DISCONTINUED | OUTPATIENT
Start: 2019-09-03 | End: 2019-09-05 | Stop reason: HOSPADM

## 2019-09-03 RX ORDER — DIVALPROEX SODIUM 250 MG/1
TABLET, DELAYED RELEASE ORAL
COMMUNITY

## 2019-09-03 RX ADMIN — FAMOTIDINE 20 MG: 20 TABLET ORAL at 09:22

## 2019-09-03 RX ADMIN — Medication 1 AMPULE: at 12:45

## 2019-09-03 RX ADMIN — Medication 1 AMPULE: at 21:41

## 2019-09-03 RX ADMIN — METOPROLOL TARTRATE 25 MG: 25 TABLET ORAL at 09:25

## 2019-09-03 RX ADMIN — FAMOTIDINE 20 MG: 20 TABLET ORAL at 17:32

## 2019-09-03 RX ADMIN — IPRATROPIUM BROMIDE AND ALBUTEROL SULFATE 3 ML: .5; 3 SOLUTION RESPIRATORY (INHALATION) at 00:46

## 2019-09-03 RX ADMIN — Medication 10 ML: at 21:45

## 2019-09-03 RX ADMIN — OLANZAPINE 2.5 MG: 2.5 TABLET, FILM COATED ORAL at 21:41

## 2019-09-03 RX ADMIN — Medication 10 ML: at 18:37

## 2019-09-03 RX ADMIN — CEFEPIME HYDROCHLORIDE 2 G: 2 INJECTION, POWDER, FOR SOLUTION INTRAVENOUS at 23:36

## 2019-09-03 RX ADMIN — SERTRALINE HYDROCHLORIDE 50 MG: 50 TABLET ORAL at 09:25

## 2019-09-03 RX ADMIN — CARBOXYMETHYLCELLULOSE SODIUM 1 DROP: 10 GEL OPHTHALMIC at 16:56

## 2019-09-03 RX ADMIN — ENOXAPARIN SODIUM 40 MG: 40 INJECTION SUBCUTANEOUS at 21:39

## 2019-09-03 RX ADMIN — IPRATROPIUM BROMIDE AND ALBUTEROL SULFATE 3 ML: .5; 3 SOLUTION RESPIRATORY (INHALATION) at 23:02

## 2019-09-03 RX ADMIN — IPRATROPIUM BROMIDE AND ALBUTEROL SULFATE 3 ML: .5; 3 SOLUTION RESPIRATORY (INHALATION) at 07:50

## 2019-09-03 RX ADMIN — ASPIRIN 81 MG 81 MG: 81 TABLET ORAL at 09:21

## 2019-09-03 RX ADMIN — DIVALPROEX SODIUM 250 MG: 250 TABLET, DELAYED RELEASE ORAL at 21:41

## 2019-09-03 RX ADMIN — IPRATROPIUM BROMIDE AND ALBUTEROL SULFATE 3 ML: .5; 3 SOLUTION RESPIRATORY (INHALATION) at 03:34

## 2019-09-03 RX ADMIN — METOPROLOL TARTRATE 25 MG: 25 TABLET ORAL at 17:32

## 2019-09-03 RX ADMIN — IPRATROPIUM BROMIDE AND ALBUTEROL SULFATE 3 ML: .5; 3 SOLUTION RESPIRATORY (INHALATION) at 19:17

## 2019-09-03 RX ADMIN — GUAIFENESIN 1200 MG: 600 TABLET, EXTENDED RELEASE ORAL at 21:42

## 2019-09-03 RX ADMIN — CEFEPIME HYDROCHLORIDE 2 G: 2 INJECTION, POWDER, FOR SOLUTION INTRAVENOUS at 16:53

## 2019-09-03 RX ADMIN — IPRATROPIUM BROMIDE AND ALBUTEROL SULFATE 3 ML: .5; 3 SOLUTION RESPIRATORY (INHALATION) at 11:20

## 2019-09-03 RX ADMIN — POLYETHYLENE GLYCOL 3350 17 G: 17 POWDER, FOR SOLUTION ORAL at 05:34

## 2019-09-03 RX ADMIN — IPRATROPIUM BROMIDE AND ALBUTEROL SULFATE 3 ML: .5; 3 SOLUTION RESPIRATORY (INHALATION) at 15:20
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
## 2019-09-03 NOTE — PROGRESS NOTES
MIDLINE Placement Note    PRE-PROCEDURE VERIFICATION  PROCEDURE DETAIL  Time out completed with Jonna Morales RN and everyone in agreement with procedure. A single lumen Midline was started for vascular access.  The following documentation is in addition to the Midline properties in the lines/airways flowsheet :  Lot #: [id number]  Xylocaine used: yes  Mid-Arm Circumference: 27 (cm)  Internal Catheter Length: 10 (cm)  Internal Catheter Total Length: 10 (cm)  Vein Selection for Midline:left basilic    Line is okay to use: yes

## 2019-09-03 NOTE — PROGRESS NOTES
Pt pulled out IV. Pt refused to let this nurse start  new IV site, Stated that he already been stuck too much. Educated and encouraged pt r/t IV site. Notified hospitalist . Dr. Park Ends at pt bedside .  No new orders

## 2019-09-03 NOTE — PROGRESS NOTES
.  END OF SHIFT NOTE:    INTAKE/OUTPUT  09/02 0701 - 09/03 0700  In: 440 [P.O.:440]  Out: 4325 [Urine:4325]  Voiding: YES  Catheter: YES  Drain:   Supra-pubic Catheter (Active)   Indications for Use Chronic urinary retention/bladder outlet obstruction 9/3/2019  2:22 AM   Status Clamped; Patent 9/3/2019  2:22 AM   Site Condition No abnormalities 9/3/2019  2:22 AM   Castillo Catheter Care Completed No 9/3/2019  2:22 AM   Drainage Tube Clipped to Bed Yes 9/3/2019  2:22 AM   Catheter Secured to Thigh Yes 9/3/2019  2:22 AM   Tamper Seal Intact Yes 9/3/2019  2:22 AM   Bag Below Bladder/Not on Floor Yes 9/3/2019  2:22 AM   Lack of Dependent Loop in Tubing Yes 9/3/2019  2:22 AM   Drainage Bag Less Than Half Full Yes 9/3/2019  2:22 AM   Sterile Solution Used for  Irrigation N/A 9/3/2019  2:22 AM   Urine Output (mL) 350 ml 9/3/2019  4:56 AM               Flatus: Patient does have flatus present. Stool:  0 occurrences. Emesis: 0 occurrences. Characteristics:        VITAL SIGNS  Patient Vitals for the past 12 hrs:   Temp Pulse Resp BP SpO2   09/03/19 0334     95 %   09/03/19 0325 97.7 °F (36.5 °C) 91 18 127/61 96 %   09/03/19 0046     96 %   09/02/19 2339 97.8 °F (36.6 °C) 89 21 123/56 95 %   09/02/19 1934     96 %   09/02/19 1925 97.6 °F (36.4 °C) 100 18 109/53 96 %       Pain Assessment  Pain Intensity 1: 7 (09/02/19 2000)  Pain Location 1: Abdomen, Arm  Pain Intervention(s) 1: Medication (see MAR)  Patient Stated Pain Goal: 0    Ambulating  Refused    Shift report given to oncoming nurse at the bedside.     Jordyn Barcenas RN

## 2019-09-03 NOTE — PROGRESS NOTES
Dispo update:  Mr. Brian Caputo of room [address] is on a 10 day Medicaid bed hold from [facility] (long-term resident). Updated Thaddeus Henry at [place] [facility] that he is not quite ready for transfer, but will keep them updated about his status.

## 2019-09-04 ENCOUNTER — APPOINTMENT (OUTPATIENT)
Dept: GENERAL RADIOLOGY | Age: 79
DRG: 291 | End: 2019-09-04
Attending: NURSE PRACTITIONER
Payer: MEDICARE

## 2019-09-04 LAB
ANION GAP SERPL CALC-SCNC: 6 MMOL/L (ref 7–16)
BACTERIA SPEC CULT: ABNORMAL
BACTERIA SPEC CULT: ABNORMAL
BASOPHILS # BLD: 0 K/UL (ref 0–0.2)
BASOPHILS NFR BLD: 0 % (ref 0–2)
BUN SERPL-MCNC: 10 MG/DL (ref 8–23)
CALCIUM SERPL-MCNC: 8 MG/DL (ref 8.3–10.4)
CHLORIDE SERPL-SCNC: 94 MMOL/L (ref 98–107)
CO2 SERPL-SCNC: 32 MMOL/L (ref 21–32)
CREAT SERPL-MCNC: 0.49 MG/DL (ref 0.8–1.5)
DIFFERENTIAL METHOD BLD: ABNORMAL
EOSINOPHIL # BLD: 0.3 K/UL (ref 0–0.8)
EOSINOPHIL NFR BLD: 4 % (ref 0.5–7.8)
ERYTHROCYTE [DISTWIDTH] IN BLOOD BY AUTOMATED COUNT: 21.2 % (ref 11.9–14.6)
GLUCOSE SERPL-MCNC: 113 MG/DL (ref 65–100)
HCT VFR BLD AUTO: 29.3 % (ref 41.1–50.3)
HGB BLD-MCNC: 9 G/DL (ref 13.6–17.2)
IMM GRANULOCYTES # BLD AUTO: 0.1 K/UL (ref 0–0.5)
IMM GRANULOCYTES NFR BLD AUTO: 1 % (ref 0–5)
LYMPHOCYTES # BLD: 1.7 K/UL (ref 0.5–4.6)
LYMPHOCYTES NFR BLD: 23 % (ref 13–44)
MCH RBC QN AUTO: 28.6 PG (ref 26.1–32.9)
MCHC RBC AUTO-ENTMCNC: 30.7 G/DL (ref 31.4–35)
MCV RBC AUTO: 93 FL (ref 79.6–97.8)
MONOCYTES # BLD: 0.7 K/UL (ref 0.1–1.3)
MONOCYTES NFR BLD: 9 % (ref 4–12)
NEUTS SEG # BLD: 4.6 K/UL (ref 1.7–8.2)
NEUTS SEG NFR BLD: 63 % (ref 43–78)
NRBC # BLD: 0 K/UL (ref 0–0.2)
PLATELET # BLD AUTO: 263 K/UL (ref 150–450)
PMV BLD AUTO: 8.5 FL (ref 9.4–12.3)
POTASSIUM SERPL-SCNC: 3.8 MMOL/L (ref 3.5–5.1)
RBC # BLD AUTO: 3.15 M/UL (ref 4.23–5.6)
SERVICE CMNT-IMP: ABNORMAL
SODIUM SERPL-SCNC: 132 MMOL/L (ref 136–145)
WBC # BLD AUTO: 7.4 K/UL (ref 4.3–11.1)

## 2019-09-04 PROCEDURE — 71045 X-RAY EXAM CHEST 1 VIEW: CPT

## 2019-09-04 PROCEDURE — 74011250636 HC RX REV CODE- 250/636: Performed by: INTERNAL MEDICINE

## 2019-09-04 PROCEDURE — 74011250637 HC RX REV CODE- 250/637: Performed by: INTERNAL MEDICINE

## 2019-09-04 PROCEDURE — 74011000250 HC RX REV CODE- 250: Performed by: NURSE PRACTITIONER

## 2019-09-04 PROCEDURE — 94640 AIRWAY INHALATION TREATMENT: CPT

## 2019-09-04 PROCEDURE — 74011250637 HC RX REV CODE- 250/637: Performed by: NURSE PRACTITIONER

## 2019-09-04 PROCEDURE — 85025 COMPLETE CBC W/AUTO DIFF WBC: CPT

## 2019-09-04 PROCEDURE — 80048 BASIC METABOLIC PNL TOTAL CA: CPT

## 2019-09-04 PROCEDURE — 94760 N-INVAS EAR/PLS OXIMETRY 1: CPT

## 2019-09-04 PROCEDURE — 77010033678 HC OXYGEN DAILY

## 2019-09-04 PROCEDURE — 77030011256 HC DRSG MEPILEX <16IN NO BORD MOLN -A

## 2019-09-04 PROCEDURE — 74011250636 HC RX REV CODE- 250/636: Performed by: NURSE PRACTITIONER

## 2019-09-04 PROCEDURE — 65660000000 HC RM CCU STEPDOWN

## 2019-09-04 PROCEDURE — 74011000258 HC RX REV CODE- 258: Performed by: NURSE PRACTITIONER

## 2019-09-04 RX ORDER — METOPROLOL TARTRATE 50 MG/1
50 TABLET ORAL 2 TIMES DAILY
Status: DISCONTINUED | OUTPATIENT
Start: 2019-09-04 | End: 2019-09-05 | Stop reason: HOSPADM

## 2019-09-04 RX ADMIN — ONDANSETRON 4 MG: 2 INJECTION INTRAMUSCULAR; INTRAVENOUS at 19:14

## 2019-09-04 RX ADMIN — LORAZEPAM 1 MG: 0.5 TABLET ORAL at 21:34

## 2019-09-04 RX ADMIN — IPRATROPIUM BROMIDE AND ALBUTEROL SULFATE 3 ML: .5; 3 SOLUTION RESPIRATORY (INHALATION) at 11:06

## 2019-09-04 RX ADMIN — CEFEPIME HYDROCHLORIDE 2 G: 2 INJECTION, POWDER, FOR SOLUTION INTRAVENOUS at 23:33

## 2019-09-04 RX ADMIN — IPRATROPIUM BROMIDE AND ALBUTEROL SULFATE 3 ML: .5; 3 SOLUTION RESPIRATORY (INHALATION) at 04:23

## 2019-09-04 RX ADMIN — ENOXAPARIN SODIUM 40 MG: 40 INJECTION SUBCUTANEOUS at 21:35

## 2019-09-04 RX ADMIN — Medication 1 AMPULE: at 21:35

## 2019-09-04 RX ADMIN — IPRATROPIUM BROMIDE AND ALBUTEROL SULFATE 3 ML: .5; 3 SOLUTION RESPIRATORY (INHALATION) at 15:39

## 2019-09-04 RX ADMIN — GUAIFENESIN 1200 MG: 600 TABLET, EXTENDED RELEASE ORAL at 09:16

## 2019-09-04 RX ADMIN — CEFEPIME HYDROCHLORIDE 2 G: 2 INJECTION, POWDER, FOR SOLUTION INTRAVENOUS at 15:58

## 2019-09-04 RX ADMIN — FUROSEMIDE 40 MG: 40 TABLET ORAL at 09:16

## 2019-09-04 RX ADMIN — POLYETHYLENE GLYCOL 3350 17 G: 17 POWDER, FOR SOLUTION ORAL at 09:15

## 2019-09-04 RX ADMIN — CARBOXYMETHYLCELLULOSE SODIUM 1 DROP: 10 GEL OPHTHALMIC at 09:17

## 2019-09-04 RX ADMIN — DIVALPROEX SODIUM 250 MG: 250 TABLET, DELAYED RELEASE ORAL at 21:34

## 2019-09-04 RX ADMIN — METOPROLOL TARTRATE 25 MG: 25 TABLET ORAL at 09:16

## 2019-09-04 RX ADMIN — Medication 1 AMPULE: at 09:15

## 2019-09-04 RX ADMIN — GUAIFENESIN 1200 MG: 600 TABLET, EXTENDED RELEASE ORAL at 21:34

## 2019-09-04 RX ADMIN — IPRATROPIUM BROMIDE AND ALBUTEROL SULFATE 3 ML: .5; 3 SOLUTION RESPIRATORY (INHALATION) at 07:09

## 2019-09-04 RX ADMIN — OLANZAPINE 2.5 MG: 2.5 TABLET, FILM COATED ORAL at 21:34

## 2019-09-04 RX ADMIN — FAMOTIDINE 20 MG: 20 TABLET ORAL at 17:16

## 2019-09-04 RX ADMIN — FAMOTIDINE 20 MG: 20 TABLET ORAL at 09:16

## 2019-09-04 RX ADMIN — CEFEPIME HYDROCHLORIDE 2 G: 2 INJECTION, POWDER, FOR SOLUTION INTRAVENOUS at 09:15

## 2019-09-04 RX ADMIN — IPRATROPIUM BROMIDE AND ALBUTEROL SULFATE 3 ML: .5; 3 SOLUTION RESPIRATORY (INHALATION) at 19:28

## 2019-09-04 RX ADMIN — LORAZEPAM 1 MG: 0.5 TABLET ORAL at 02:22

## 2019-09-04 RX ADMIN — Medication 10 ML: at 13:22

## 2019-09-04 RX ADMIN — SERTRALINE HYDROCHLORIDE 50 MG: 50 TABLET ORAL at 09:16

## 2019-09-04 RX ADMIN — Medication 10 ML: at 21:36

## 2019-09-04 RX ADMIN — METOPROLOL TARTRATE 50 MG: 50 TABLET ORAL at 17:16

## 2019-09-04 RX ADMIN — IPRATROPIUM BROMIDE AND ALBUTEROL SULFATE 3 ML: .5; 3 SOLUTION RESPIRATORY (INHALATION) at 23:13

## 2019-09-04 RX ADMIN — ASPIRIN 81 MG 81 MG: 81 TABLET ORAL at 09:16
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
## 2019-09-04 NOTE — PROGRESS NOTES
[facility] CARDIOLOGY PROGRESS NOTE           9/4/2019 10:38 AM    Admit Date: 8/30/2019      Subjective:   Comfortable      Objective:      Vitals:    09/04/19 0419 09/04/19 0423 09/04/19 0700 09/04/19 0709   BP: 149/76  119/73    Pulse: (!) 110  (!) 114    Resp: (!) 33  20    Temp: 98.2 °F (36.8 °C)  97.7 °F (36.5 °C)    SpO2: 100% 99% 96% 97%   Weight:       Height:           Physical Exam:  General-resting comfotably  Neck- supple, no JVD  CV- irregular rate and rhythm no MRG  Lung- clear bilaterally  Abd- soft, nontender, nondistended  Ext- no edema bilaterally. Skin- warm and dry    Data Review:   Recent Labs     09/04/19  0536 09/03/19  0640   * 136   K 3.8 4.1   BUN 10 11   CREA 0.49* 0.53*   * 100   WBC 7.4 6.1   HGB 9.0* 9.3*   HCT 29.3* 31.4*    268       Assessment/Plan:     HFpEF  Chronic a fib  Quadreplegia  ///  Cont. Lasix  Inc metoprolol  Poor candidate for [employer] at this time. RENZO w/ [facility] a dc  On standby.      Ian Hammond MD  9/4/2019 10:38 AM

## 2019-09-04 NOTE — CDMP QUERY
Patient admitted with UTI. Patient noted to have chronic suprapubic catheter.  If possible, please document in the progress notes and d/c summary if you are evaluating and / or treating any of the following:     UTI due to chronic suprapubic catheter   UTI not due to Castillo   Other   Clinically unable to determine    The medical record reflects the following:     Risk Factors: suprapubic catheter     Clinical Indicators: UTI, UA with Klebsiella Pneumoniae and Pseudomonas Aeruginosa, Urine cloudy,sediment, malodorous     Treatment: Catherine Quintana,  Lauren Goss RN, BSN, CDS  Clinical Documentation Improvement  [email or web address]  [phone]

## 2019-09-04 NOTE — PROGRESS NOTES
Dispo update:  Updated Mr. Luba Pa at [place] that Mr. Pasqual Kussmaul may be ready for transfer back to [facility] tomorrow (9/5/2019).

## 2019-09-04 NOTE — CDMP QUERY
Patient admitted with acute on chronic diastolic CHF and abdominal hematoma. Noted documentation of \"acute respiratory failure\" in H&P and on progress notes. Please document in progress notes clinical indicators (such as the ones below) to support this diagnosis.    - Respirations <12 or >25   - Air hunger/gasping  - Use of accessory muscles of respiration/increased work of breathing  - Sternal or intercostal retractions  - Stridor   - Inability to speak in full sentences   - Cyanosis   - Pulse ox <90% RA or <95% on O2   - pH <7.35 or >7.45   - pO2 < 60 mm Hg (or 10mm below COPD patient's baseline)   - pCO2 >50mm Hg (or 10mm above COPD patient's baseline)    The medical record reflects the following:     Risk Factors: acute CHF     Clinical Indicators: O2 sats % on RA to 2L, RR 28, no documentation of WOB/accessory muscle use, no ABG     Treatment: RA to 2L O2, Nebs, Lasix IV x 3 doses    Thanks,  Karolina Padilla, RN, BSN, CDS  Clinical Documentation Improvement  [email or web address]  [phone]
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
## 2019-09-05 ENCOUNTER — APPOINTMENT (OUTPATIENT)
Dept: GENERAL RADIOLOGY | Age: 79
DRG: 291 | End: 2019-09-05
Attending: NURSE PRACTITIONER
Payer: MEDICARE

## 2019-09-05 VITALS
SYSTOLIC BLOOD PRESSURE: 107 MMHG | DIASTOLIC BLOOD PRESSURE: 58 MMHG | OXYGEN SATURATION: 96 % | BODY MASS INDEX: 30.61 KG/M2 | RESPIRATION RATE: 15 BRPM | WEIGHT: 195 LBS | HEIGHT: 67 IN | TEMPERATURE: 98.5 F | HEART RATE: 100 BPM

## 2019-09-05 LAB
ANION GAP SERPL CALC-SCNC: 4 MMOL/L (ref 7–16)
BASOPHILS # BLD: 0 K/UL (ref 0–0.2)
BASOPHILS NFR BLD: 1 % (ref 0–2)
BUN SERPL-MCNC: 13 MG/DL (ref 8–23)
CALCIUM SERPL-MCNC: 8.4 MG/DL (ref 8.3–10.4)
CHLORIDE SERPL-SCNC: 98 MMOL/L (ref 98–107)
CO2 SERPL-SCNC: 32 MMOL/L (ref 21–32)
CREAT SERPL-MCNC: 0.51 MG/DL (ref 0.8–1.5)
DIFFERENTIAL METHOD BLD: ABNORMAL
EOSINOPHIL # BLD: 0.2 K/UL (ref 0–0.8)
EOSINOPHIL NFR BLD: 4 % (ref 0.5–7.8)
ERYTHROCYTE [DISTWIDTH] IN BLOOD BY AUTOMATED COUNT: 21.1 % (ref 11.9–14.6)
GLUCOSE SERPL-MCNC: 109 MG/DL (ref 65–100)
HCT VFR BLD AUTO: 29.8 % (ref 41.1–50.3)
HGB BLD-MCNC: 9.1 G/DL (ref 13.6–17.2)
IMM GRANULOCYTES # BLD AUTO: 0.1 K/UL (ref 0–0.5)
IMM GRANULOCYTES NFR BLD AUTO: 1 % (ref 0–5)
LYMPHOCYTES # BLD: 1.5 K/UL (ref 0.5–4.6)
LYMPHOCYTES NFR BLD: 24 % (ref 13–44)
MCH RBC QN AUTO: 28.3 PG (ref 26.1–32.9)
MCHC RBC AUTO-ENTMCNC: 30.5 G/DL (ref 31.4–35)
MCV RBC AUTO: 92.5 FL (ref 79.6–97.8)
MONOCYTES # BLD: 0.6 K/UL (ref 0.1–1.3)
MONOCYTES NFR BLD: 10 % (ref 4–12)
NEUTS SEG # BLD: 3.7 K/UL (ref 1.7–8.2)
NEUTS SEG NFR BLD: 61 % (ref 43–78)
NRBC # BLD: 0 K/UL (ref 0–0.2)
PLATELET # BLD AUTO: 242 K/UL (ref 150–450)
PMV BLD AUTO: 8.6 FL (ref 9.4–12.3)
POTASSIUM SERPL-SCNC: 4.1 MMOL/L (ref 3.5–5.1)
RBC # BLD AUTO: 3.22 M/UL (ref 4.23–5.6)
SODIUM SERPL-SCNC: 134 MMOL/L (ref 136–145)
WBC # BLD AUTO: 6.2 K/UL (ref 4.3–11.1)

## 2019-09-05 PROCEDURE — 74011250637 HC RX REV CODE- 250/637: Performed by: NURSE PRACTITIONER

## 2019-09-05 PROCEDURE — 94640 AIRWAY INHALATION TREATMENT: CPT

## 2019-09-05 PROCEDURE — 74011000258 HC RX REV CODE- 258: Performed by: NURSE PRACTITIONER

## 2019-09-05 PROCEDURE — 74011250637 HC RX REV CODE- 250/637: Performed by: INTERNAL MEDICINE

## 2019-09-05 PROCEDURE — 74011000250 HC RX REV CODE- 250: Performed by: NURSE PRACTITIONER

## 2019-09-05 PROCEDURE — 74011250636 HC RX REV CODE- 250/636: Performed by: NURSE PRACTITIONER

## 2019-09-05 PROCEDURE — 71045 X-RAY EXAM CHEST 1 VIEW: CPT

## 2019-09-05 PROCEDURE — 90471 IMMUNIZATION ADMIN: CPT

## 2019-09-05 PROCEDURE — 77030019605

## 2019-09-05 PROCEDURE — 74011250636 HC RX REV CODE- 250/636: Performed by: INTERNAL MEDICINE

## 2019-09-05 PROCEDURE — 80048 BASIC METABOLIC PNL TOTAL CA: CPT

## 2019-09-05 PROCEDURE — 90686 IIV4 VACC NO PRSV 0.5 ML IM: CPT | Performed by: INTERNAL MEDICINE

## 2019-09-05 PROCEDURE — 94760 N-INVAS EAR/PLS OXIMETRY 1: CPT

## 2019-09-05 PROCEDURE — 85025 COMPLETE CBC W/AUTO DIFF WBC: CPT

## 2019-09-05 RX ORDER — FUROSEMIDE 20 MG/1
40 TABLET ORAL DAILY
Qty: 20 TAB | Refills: 0 | Status: SHIPPED | OUTPATIENT
Start: 2019-09-05 | End: 2019-09-15

## 2019-09-05 RX ORDER — METOPROLOL TARTRATE 25 MG/1
50 TABLET, FILM COATED ORAL 2 TIMES DAILY
Qty: 120 TAB | Refills: 0 | Status: SHIPPED
Start: 2019-09-05 | End: 2019-10-05

## 2019-09-05 RX ADMIN — FAMOTIDINE 20 MG: 20 TABLET ORAL at 09:17

## 2019-09-05 RX ADMIN — IPRATROPIUM BROMIDE AND ALBUTEROL SULFATE 3 ML: .5; 3 SOLUTION RESPIRATORY (INHALATION) at 07:13

## 2019-09-05 RX ADMIN — ASPIRIN 81 MG 81 MG: 81 TABLET ORAL at 09:18

## 2019-09-05 RX ADMIN — SERTRALINE HYDROCHLORIDE 50 MG: 50 TABLET ORAL at 09:16

## 2019-09-05 RX ADMIN — Medication 10 ML: at 05:00

## 2019-09-05 RX ADMIN — CARBOXYMETHYLCELLULOSE SODIUM 1 DROP: 10 GEL OPHTHALMIC at 09:18

## 2019-09-05 RX ADMIN — Medication 1 AMPULE: at 09:15

## 2019-09-05 RX ADMIN — FUROSEMIDE 40 MG: 40 TABLET ORAL at 09:17

## 2019-09-05 RX ADMIN — GUAIFENESIN 1200 MG: 600 TABLET, EXTENDED RELEASE ORAL at 09:17

## 2019-09-05 RX ADMIN — CEFEPIME HYDROCHLORIDE 2 G: 2 INJECTION, POWDER, FOR SOLUTION INTRAVENOUS at 09:18

## 2019-09-05 RX ADMIN — INFLUENZA VIRUS VACCINE 0.5 ML: 15; 15; 15; 15 SUSPENSION INTRAMUSCULAR at 12:03

## 2019-09-05 RX ADMIN — METOPROLOL TARTRATE 50 MG: 50 TABLET ORAL at 09:16

## 2019-09-05 RX ADMIN — IPRATROPIUM BROMIDE AND ALBUTEROL SULFATE 3 ML: .5; 3 SOLUTION RESPIRATORY (INHALATION) at 11:43
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
## 2019-09-05 NOTE — ROUTINE PROCESS
CHF teaching started post introduction to pt/SNF; aware of diagnosis. Planner@ BS and will follow. Smoking/ ETOH/Illicit drug use cessation and maintain a healthy weight covered. Pt/family aware that I can not prescribe nor adjust  medications: 15mins  Palliative Care score:  Refused ACP on admission  Start 2L/D Fluid restriction/ cardiac diet  CHF teaching continues to pt/SNF. Emphasis on taking prescription meds as ordered, to keep F/U appts and to call MD STAT if any of the following occur:   If you gain 2 lbs in one day or 5 lbs in a week, and short of breath.  If you can not lay flat without developing short of breath or rapid breathing at night; or if it wakes you up. Develop a cough or wheezing.  If you notice swollen hands/feet/ankles or stomach with a bloated/ full feeling.  If you become confused or mentally fuzzy or dizzy.  If you notice a rapid or change in your heart rate.  If you become more exhausted all the time and unable to do the same level of activity without stopping to catch your breath. Drink no more than 8 cups a day in 8 oz. cups. Your Heart can not handle any more. Stay away from salt (limit anything with salt or sodium in it). Limit to 250mg per serving. Pt/fSNF verbalizes understanding.    Drink no more than 8 cups a day in 8 oz. cups. Limit Cola Drinks. Your Heart can not handle any more. Stay away from salt (limit anything with salt or sodium in it). Limit to 250mg per serving. Exercise needs to be started with your Doctors approval.  Reduce stress; Call myself or Provider if assistance is needed. Pass post test via teach back, will make self available post DC ,if an questions arise. Diabetic teaching completed.  Planner/scale @ BS:  60 mins total
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
## 2019-09-05 NOTE — ROUTINE PROCESS
CHF teaching/introduction held; multi-staFF 2 bs, WILL FOLLOW.     [address]; Dr Delvalle Fails on 11 SEPT

## 2019-09-05 NOTE — DISCHARGE INSTRUCTIONS
Patient Education   BMP x 1 week        CHF educator: Start 2L/D Fluid restriction/ cardiac diet  CHF teaching continues to pt/SNF. Emphasis on taking prescription meds as ordered, to keep F/U appts and to call MD STAT if any of the following occur:  · If you gain 2 lbs in one day or 5 lbs in a week, and short of breath. · If you can not lay flat without developing short of breath or rapid breathing at night; or if it wakes you up. Develop a cough or wheezing. · If you notice swollen hands/feet/ankles or stomach with a bloated/ full feeling. · If you become confused or mentally fuzzy or dizzy. · If you notice a rapid or change in your heart rate. · If you become more exhausted all the time and unable to do the same level of activity without stopping to catch your breath. Drink no more than 8 cups a day in 8 oz. cups. Your Heart can not handle any more. Stay away from salt (limit anything with salt or sodium in it). Limit to 250mg per serving. Pt/fSNF verbalizes understanding.     ·    Drink no more than 8 cups a day in 8 oz. cups. Limit Cola Drinks. Your Heart can not handle any more. Stay away from salt (limit anything with salt or sodium in it). Limit to 250mg per serving. Exercise needs to be started with your Doctors approval.  Reduce stress; Call myself or Provider if assistance is needed. Pass post test via teach back, will make self available post DC ,if an questions arise. Diabetic teaching completed. Planner/scale @ BS:  60 mins total     Heart Failure: Care Instructions  Your Care Instructions    Heart failure occurs when your heart does not pump as much blood as the body needs. Failure does not mean that the heart has stopped pumping but rather that it is not pumping as well as it should. Over time, this causes fluid buildup in your lungs and other parts of your body. Fluid buildup can cause shortness of breath, fatigue, swollen ankles, and other problems.  By taking medicines regularly, reducing sodium (salt) in your diet, checking your weight every day, and making lifestyle changes, you can feel better and live longer. Follow-up care is a key part of your treatment and safety. Be sure to make and go to all appointments, and call your doctor if you are having problems. It's also a good idea to know your test results and keep a list of the medicines you take. How can you care for yourself at home? Medicines    · Be safe with medicines. Take your medicines exactly as prescribed. Call your doctor if you think you are having a problem with your medicine.     · Do not take any vitamins, over-the-counter medicine, or herbal products without talking to your doctor first. Mika Baig not take ibuprofen (Advil or Motrin) and naproxen (Aleve) without talking to your doctor first. They could make your heart failure worse.     · You may be taking some of the following medicine. ? Beta-blockers can slow heart rate, decrease blood pressure, and improve your condition. Taking a beta-blocker may lower your chance of needing to be hospitalized. ? Angiotensin-converting enzyme inhibitors (ACEIs) reduce the heart's workload, lower blood pressure, and reduce swelling. Taking an ACEI may lower your chance of needing to be hospitalized again. ? Angiotensin II receptor blockers (ARBs) work like ACEIs. Your doctor may prescribe them instead of ACEIs. ? Diuretics, also called water pills, reduce swelling. ? Potassium supplements replace this important mineral, which is sometimes lost with diuretics. ? Aspirin and other blood thinners prevent blood clots, which can cause a stroke or heart attack.    You will get more details on the specific medicines your doctor prescribes. Diet    · Your doctor may suggest that you limit sodium to 2,000 milligrams (mg) a day or less. That is less than 1 teaspoon of salt a day, including all the salt you eat in cooking or in packaged foods.  People get most of their sodium from processed foods. Fast food and restaurant meals also tend to be very high in sodium.     · Ask your doctor how much liquid you can drink each day. You may have to limit liquids.    Weight    · Weigh yourself without clothing at the same time each day. Record your weight. Call your doctor if you have a sudden weight gain, such as more than 2 to 3 pounds in a day or 5 pounds in a week. (Your doctor may suggest a different range of weight gain.) A sudden weight gain may mean that your heart failure is getting worse.    Activity level    · Start light exercise (if your doctor says it is okay). Even if you can only do a small amount, exercise will help you get stronger, have more energy, and manage your weight and your stress. Walking is an easy way to get exercise. Start out by walking a little more than you did before. Bit by bit, increase the amount you walk.     · When you exercise, watch for signs that your heart is working too hard. You are pushing yourself too hard if you cannot talk while you are exercising. If you become short of breath or dizzy or have chest pain, stop, sit down, and rest.     · If you feel \"wiped out\" the day after you exercise, walk slower or for a shorter distance until you can work up to a better pace.     · Get enough rest at night. Sleeping with 1 or 2 pillows under your upper body and head may help you breathe easier.    Lifestyle changes    · Do not smoke. Smoking can make a heart condition worse. If you need help quitting, talk to your doctor about stop-smoking programs and medicines. These can increase your chances of quitting for good. Quitting smoking may be the most important step you can take to protect your heart.     · Limit alcohol to 2 drinks a day for men and 1 drink a day for women. Too much alcohol can cause health problems.     · Avoid getting sick from colds and the flu. Get a pneumococcal vaccine shot. If you have had one before, ask your doctor whether you need another dose.  Get a flu shot each year. If you must be around people with colds or the flu, wash your hands often. When should you call for help? Call 911 if you have symptoms of sudden heart failure such as:    · You have severe trouble breathing.     · You cough up pink, foamy mucus.     · You have a new irregular or rapid heartbeat.    Call your doctor now or seek immediate medical care if:    · You have new or increased shortness of breath.     · You are dizzy or lightheaded, or you feel like you may faint.     · You have sudden weight gain, such as more than 2 to 3 pounds in a day or 5 pounds in a week. (Your doctor may suggest a different range of weight gain.)     · You have increased swelling in your legs, ankles, or feet.     · You are suddenly so tired or weak that you cannot do your usual activities.    Watch closely for changes in your health, and be sure to contact your doctor if you develop new symptoms. Where can you learn more? Go to http://tate-celestina.info/. Enter R002 in the search box to learn more about \"Heart Failure: Care Instructions. \"  Current as of: July 22, 2018  Content Version: 12.1  © 6518-3209 [employer]. Care instructions adapted under license by [employer] (which disclaims liability or warranty for this information). If you have questions about a medical condition or this instruction, always ask your healthcare professional. [place] [address] any warranty or liability for your use of this information.

## 2019-09-05 NOTE — WOUND CARE
Patient seen for follow up. No change in skin condition. Encourage to remove brief and keep turned side to side. Updated patient and nursing student at bedside.

## 2019-09-05 NOTE — PROGRESS NOTES
Patient will be d/c back to [facility]  were he's along term resident. CM made an attempt to make contact with patient sister Mj Panchal) left voicemail. Patient will be transport back to the facility by [facility] patient is agreeable with transport scheduled for 2:30PM.  Patient has met all treatment goals / milestones. CM will continue to monitor. Care Management Interventions  PCP Verified by CM: (followed by MD at [facility])  Transition of Care Consult (CM Consult): SNF(Pt is a long term care resident at [address] [place] [address]. Per chart review he has been a resident there since about 2015.   He has a 10 day medicaid bedhold that will be through 9/9/2019.  )  Partner SNF: Yes  Discharge Durable Medical Equipment: No(any needed DME will be provided by the SNF if orders recieved)  Physical Therapy Consult: Yes  Occupational Therapy Consult: Yes  Speech Therapy Consult: Yes  Current Support Network: Nursing Facility(Pt is a long tern care resident at [address] [place] [address] where he has lived since 2015.)  Confirm Follow Up Transport: Other (see comment)(transport)  Plan discussed with Pt/Family/Caregiver: Yes  Freedom of Choice Offered: Yes  [place] Resource Information Provided?: No  Discharge Location  Discharge Placement: Rehab Unit Subacute(Pt will return to his long term care medicaid bed at [facility] at discharge.)

## 2019-09-06 ENCOUNTER — PATIENT OUTREACH (OUTPATIENT)
Dept: CASE MANAGEMENT | Age: 79
End: 2019-09-06

## 2019-10-31 ENCOUNTER — APPOINTMENT (OUTPATIENT)
Dept: GENERAL RADIOLOGY | Age: 79
DRG: 698 | End: 2019-10-31
Attending: EMERGENCY MEDICINE
Payer: MEDICARE

## 2019-10-31 ENCOUNTER — HOSPITAL ENCOUNTER (INPATIENT)
Age: 79
LOS: 6 days | Discharge: NURSING, REHAB OR LONG-TERM CARE | DRG: 698 | End: 2019-11-06
Attending: EMERGENCY MEDICINE | Admitting: HOSPITALIST
Payer: MEDICARE

## 2019-10-31 DIAGNOSIS — A41.9 SEPSIS, DUE TO UNSPECIFIED ORGANISM, UNSPECIFIED WHETHER ACUTE ORGAN DYSFUNCTION PRESENT (HCC): Primary | ICD-10-CM

## 2019-10-31 DIAGNOSIS — G82.50 QUADRIPLEGIA (HCC): Chronic | ICD-10-CM

## 2019-10-31 PROBLEM — I48.91 ATRIAL FIBRILLATION WITH RAPID VENTRICULAR RESPONSE (HCC): Status: ACTIVE | Noted: 2019-10-31

## 2019-10-31 PROBLEM — N39.0 SEPSIS DUE TO URINARY TRACT INFECTION (HCC): Status: ACTIVE | Noted: 2019-10-31

## 2019-10-31 LAB
ALBUMIN SERPL-MCNC: 2.5 G/DL (ref 3.2–4.6)
ALBUMIN/GLOB SERPL: 0.4 {RATIO} (ref 1.2–3.5)
ALP SERPL-CCNC: 139 U/L (ref 50–136)
ALT SERPL-CCNC: 20 U/L (ref 12–65)
ANION GAP SERPL CALC-SCNC: 9 MMOL/L (ref 7–16)
APPEARANCE UR: ABNORMAL
AST SERPL-CCNC: 22 U/L (ref 15–37)
ATRIAL RATE: 197 BPM
BACTERIA URNS QL MICRO: ABNORMAL /HPF
BASOPHILS # BLD: 0.1 K/UL (ref 0–0.2)
BASOPHILS NFR BLD: 1 % (ref 0–2)
BILIRUB SERPL-MCNC: 0.5 MG/DL (ref 0.2–1.1)
BILIRUB UR QL: ABNORMAL
BUN SERPL-MCNC: 15 MG/DL (ref 8–23)
CALCIUM SERPL-MCNC: 9.4 MG/DL (ref 8.3–10.4)
CALCULATED R AXIS, ECG10: 36 DEGREES
CALCULATED T AXIS, ECG11: 91 DEGREES
CHLORIDE SERPL-SCNC: 97 MMOL/L (ref 98–107)
CO2 SERPL-SCNC: 31 MMOL/L (ref 21–32)
COLOR UR: ABNORMAL
CREAT SERPL-MCNC: 0.8 MG/DL (ref 0.8–1.5)
DIAGNOSIS, 93000: NORMAL
DIFFERENTIAL METHOD BLD: ABNORMAL
EOSINOPHIL # BLD: 0.1 K/UL (ref 0–0.8)
EOSINOPHIL NFR BLD: 1 % (ref 0.5–7.8)
ERYTHROCYTE [DISTWIDTH] IN BLOOD BY AUTOMATED COUNT: 15.4 % (ref 11.9–14.6)
GLOBULIN SER CALC-MCNC: 6.2 G/DL (ref 2.3–3.5)
GLUCOSE SERPL-MCNC: 169 MG/DL (ref 65–100)
GLUCOSE UR STRIP.AUTO-MCNC: NEGATIVE MG/DL
HCT VFR BLD AUTO: 43.6 % (ref 41.1–50.3)
HGB BLD-MCNC: 13.8 G/DL (ref 13.6–17.2)
HGB UR QL STRIP: ABNORMAL
IMM GRANULOCYTES # BLD AUTO: 0.1 K/UL (ref 0–0.5)
IMM GRANULOCYTES NFR BLD AUTO: 1 % (ref 0–5)
KETONES UR QL STRIP.AUTO: 40 MG/DL
LACTATE BLD-SCNC: 3.11 MMOL/L (ref 0.5–1.9)
LACTATE SERPL-SCNC: 1.5 MMOL/L (ref 0.4–2)
LEUKOCYTE ESTERASE UR QL STRIP.AUTO: ABNORMAL
LYMPHOCYTES # BLD: 1.4 K/UL (ref 0.5–4.6)
LYMPHOCYTES NFR BLD: 16 % (ref 13–44)
MCH RBC QN AUTO: 29.2 PG (ref 26.1–32.9)
MCHC RBC AUTO-ENTMCNC: 31.7 G/DL (ref 31.4–35)
MCV RBC AUTO: 92.4 FL (ref 79.6–97.8)
MONOCYTES # BLD: 0.7 K/UL (ref 0.1–1.3)
MONOCYTES NFR BLD: 8 % (ref 4–12)
NEUTS SEG # BLD: 6.4 K/UL (ref 1.7–8.2)
NEUTS SEG NFR BLD: 73 % (ref 43–78)
NITRITE UR QL STRIP.AUTO: POSITIVE
NRBC # BLD: 0 K/UL (ref 0–0.2)
OTHER OBSERVATIONS,UCOM: ABNORMAL
PH UR STRIP: 6 [PH] (ref 5–9)
PLATELET # BLD AUTO: 296 K/UL (ref 150–450)
PLATELET COMMENTS,PCOM: ADEQUATE
PMV BLD AUTO: 9 FL (ref 9.4–12.3)
POTASSIUM SERPL-SCNC: 4.1 MMOL/L (ref 3.5–5.1)
PROT SERPL-MCNC: 8.7 G/DL (ref 6.3–8.2)
PROT UR STRIP-MCNC: 100 MG/DL
Q-T INTERVAL, ECG07: 324 MS
QRS DURATION, ECG06: 70 MS
QTC CALCULATION (BEZET), ECG08: 455 MS
RBC # BLD AUTO: 4.72 M/UL (ref 4.23–5.6)
RBC #/AREA URNS HPF: >100 /HPF
RBC MORPH BLD: ABNORMAL
SODIUM SERPL-SCNC: 137 MMOL/L (ref 136–145)
SP GR UR REFRACTOMETRY: 1.01 (ref 1–1.02)
TROPONIN I SERPL-MCNC: <0.02 NG/ML (ref 0.02–0.05)
UROBILINOGEN UR QL STRIP.AUTO: 1 EU/DL (ref 0.2–1)
VENTRICULAR RATE, ECG03: 119 BPM
WBC # BLD AUTO: 8.8 K/UL (ref 4.3–11.1)
WBC MORPH BLD: ABNORMAL
WBC URNS QL MICRO: ABNORMAL /HPF

## 2019-10-31 PROCEDURE — 77010033678 HC OXYGEN DAILY

## 2019-10-31 PROCEDURE — 94760 N-INVAS EAR/PLS OXIMETRY 1: CPT

## 2019-10-31 PROCEDURE — 93005 ELECTROCARDIOGRAM TRACING: CPT | Performed by: EMERGENCY MEDICINE

## 2019-10-31 PROCEDURE — 36415 COLL VENOUS BLD VENIPUNCTURE: CPT

## 2019-10-31 PROCEDURE — 77030019605

## 2019-10-31 PROCEDURE — 84484 ASSAY OF TROPONIN QUANT: CPT

## 2019-10-31 PROCEDURE — 77030040361 HC SLV COMPR DVT MDII -B

## 2019-10-31 PROCEDURE — 87086 URINE CULTURE/COLONY COUNT: CPT

## 2019-10-31 PROCEDURE — 85025 COMPLETE CBC W/AUTO DIFF WBC: CPT

## 2019-10-31 PROCEDURE — 87040 BLOOD CULTURE FOR BACTERIA: CPT

## 2019-10-31 PROCEDURE — 71045 X-RAY EXAM CHEST 1 VIEW: CPT

## 2019-10-31 PROCEDURE — 77030020263 HC SOL INJ SOD CL0.9% LFCR 1000ML

## 2019-10-31 PROCEDURE — 65660000000 HC RM CCU STEPDOWN

## 2019-10-31 PROCEDURE — 96360 HYDRATION IV INFUSION INIT: CPT | Performed by: EMERGENCY MEDICINE

## 2019-10-31 PROCEDURE — 74011250636 HC RX REV CODE- 250/636: Performed by: HOSPITALIST

## 2019-10-31 PROCEDURE — 74011250637 HC RX REV CODE- 250/637: Performed by: HOSPITALIST

## 2019-10-31 PROCEDURE — 74011000258 HC RX REV CODE- 258: Performed by: EMERGENCY MEDICINE

## 2019-10-31 PROCEDURE — 74011250636 HC RX REV CODE- 250/636: Performed by: EMERGENCY MEDICINE

## 2019-10-31 PROCEDURE — 80053 COMPREHEN METABOLIC PANEL: CPT

## 2019-10-31 PROCEDURE — 83605 ASSAY OF LACTIC ACID: CPT

## 2019-10-31 PROCEDURE — 99285 EMERGENCY DEPT VISIT HI MDM: CPT | Performed by: EMERGENCY MEDICINE

## 2019-10-31 PROCEDURE — 81001 URINALYSIS AUTO W/SCOPE: CPT

## 2019-10-31 RX ORDER — ACETAMINOPHEN 325 MG/1
650 TABLET ORAL
Status: DISCONTINUED | OUTPATIENT
Start: 2019-10-31 | End: 2019-11-06 | Stop reason: HOSPADM

## 2019-10-31 RX ORDER — VANCOMYCIN 2 GRAM/500 ML IN 0.9 % SODIUM CHLORIDE INTRAVENOUS
2000 ONCE
Status: COMPLETED | OUTPATIENT
Start: 2019-10-31 | End: 2019-10-31

## 2019-10-31 RX ORDER — SODIUM CHLORIDE 9 MG/ML
125 INJECTION, SOLUTION INTRAVENOUS CONTINUOUS
Status: DISCONTINUED | OUTPATIENT
Start: 2019-10-31 | End: 2019-11-05

## 2019-10-31 RX ORDER — POLYETHYLENE GLYCOL 3350 17 G/17G
17 POWDER, FOR SOLUTION ORAL
Status: DISCONTINUED | OUTPATIENT
Start: 2019-11-01 | End: 2019-11-04

## 2019-10-31 RX ORDER — VANCOMYCIN HYDROCHLORIDE
1250 EVERY 12 HOURS
Status: DISCONTINUED | OUTPATIENT
Start: 2019-11-01 | End: 2019-11-02

## 2019-10-31 RX ORDER — SODIUM CHLORIDE 0.9 % (FLUSH) 0.9 %
5-40 SYRINGE (ML) INJECTION EVERY 8 HOURS
Status: DISCONTINUED | OUTPATIENT
Start: 2019-10-31 | End: 2019-11-06 | Stop reason: HOSPADM

## 2019-10-31 RX ORDER — LANOLIN ALCOHOL/MO/W.PET/CERES
400 CREAM (GRAM) TOPICAL DAILY
Status: DISCONTINUED | OUTPATIENT
Start: 2019-11-01 | End: 2019-11-06 | Stop reason: HOSPADM

## 2019-10-31 RX ORDER — ONDANSETRON 2 MG/ML
4 INJECTION INTRAMUSCULAR; INTRAVENOUS
Status: DISCONTINUED | OUTPATIENT
Start: 2019-10-31 | End: 2019-11-06 | Stop reason: HOSPADM

## 2019-10-31 RX ORDER — FAMOTIDINE 20 MG/1
20 TABLET, FILM COATED ORAL DAILY
Status: DISCONTINUED | OUTPATIENT
Start: 2019-11-01 | End: 2019-11-06 | Stop reason: HOSPADM

## 2019-10-31 RX ORDER — OLANZAPINE 2.5 MG/1
2.5 TABLET ORAL
Status: DISCONTINUED | OUTPATIENT
Start: 2019-10-31 | End: 2019-11-06 | Stop reason: HOSPADM

## 2019-10-31 RX ORDER — ONDANSETRON 4 MG/1
4 TABLET, ORALLY DISINTEGRATING ORAL
Status: DISCONTINUED | OUTPATIENT
Start: 2019-10-31 | End: 2019-11-06 | Stop reason: HOSPADM

## 2019-10-31 RX ORDER — DOCUSATE SODIUM 100 MG/1
100 CAPSULE, LIQUID FILLED ORAL 2 TIMES DAILY
Status: DISCONTINUED | OUTPATIENT
Start: 2019-10-31 | End: 2019-11-06

## 2019-10-31 RX ORDER — SERTRALINE HYDROCHLORIDE 50 MG/1
50 TABLET, FILM COATED ORAL DAILY
Status: DISCONTINUED | OUTPATIENT
Start: 2019-11-01 | End: 2019-11-06 | Stop reason: HOSPADM

## 2019-10-31 RX ORDER — VANCOMYCIN HYDROCHLORIDE 1 G/20ML
INJECTION, POWDER, LYOPHILIZED, FOR SOLUTION INTRAVENOUS EVERY 24 HOURS
Status: DISCONTINUED | OUTPATIENT
Start: 2019-10-31 | End: 2019-10-31 | Stop reason: CLARIF

## 2019-10-31 RX ORDER — LANOLIN ALCOHOL/MO/W.PET/CERES
325 CREAM (GRAM) TOPICAL EVERY OTHER DAY
Status: DISCONTINUED | OUTPATIENT
Start: 2019-11-01 | End: 2019-11-06 | Stop reason: HOSPADM

## 2019-10-31 RX ORDER — GUAIFENESIN 600 MG/1
1200 TABLET, EXTENDED RELEASE ORAL 2 TIMES DAILY
Status: DISCONTINUED | OUTPATIENT
Start: 2019-10-31 | End: 2019-11-06 | Stop reason: HOSPADM

## 2019-10-31 RX ORDER — DIVALPROEX SODIUM 250 MG/1
250 TABLET, DELAYED RELEASE ORAL
Status: DISCONTINUED | OUTPATIENT
Start: 2019-10-31 | End: 2019-11-06 | Stop reason: HOSPADM

## 2019-10-31 RX ORDER — SODIUM CHLORIDE 0.9 % (FLUSH) 0.9 %
5-40 SYRINGE (ML) INJECTION AS NEEDED
Status: DISCONTINUED | OUTPATIENT
Start: 2019-10-31 | End: 2019-11-06 | Stop reason: HOSPADM

## 2019-10-31 RX ORDER — MELATONIN
2000 DAILY
Status: DISCONTINUED | OUTPATIENT
Start: 2019-11-01 | End: 2019-11-06 | Stop reason: HOSPADM

## 2019-10-31 RX ORDER — ALBUTEROL SULFATE 0.83 MG/ML
2.5 SOLUTION RESPIRATORY (INHALATION)
Status: DISCONTINUED | OUTPATIENT
Start: 2019-10-31 | End: 2019-11-06 | Stop reason: HOSPADM

## 2019-10-31 RX ORDER — IPRATROPIUM BROMIDE AND ALBUTEROL SULFATE 2.5; .5 MG/3ML; MG/3ML
3 SOLUTION RESPIRATORY (INHALATION)
Status: DISCONTINUED | OUTPATIENT
Start: 2019-10-31 | End: 2019-11-06 | Stop reason: HOSPADM

## 2019-10-31 RX ORDER — OXYBUTYNIN CHLORIDE 5 MG/1
5 TABLET ORAL 3 TIMES DAILY
Status: DISCONTINUED | OUTPATIENT
Start: 2019-10-31 | End: 2019-11-06 | Stop reason: HOSPADM

## 2019-10-31 RX ORDER — TRAZODONE HYDROCHLORIDE 50 MG/1
50 TABLET ORAL
Status: DISCONTINUED | OUTPATIENT
Start: 2019-10-31 | End: 2019-11-05

## 2019-10-31 RX ORDER — NYSTATIN 100000 [USP'U]/G
POWDER TOPICAL 2 TIMES DAILY
Status: DISCONTINUED | OUTPATIENT
Start: 2019-10-31 | End: 2019-10-31

## 2019-10-31 RX ORDER — ASPIRIN 81 MG/1
81 TABLET ORAL DAILY
Status: DISCONTINUED | OUTPATIENT
Start: 2019-11-01 | End: 2019-11-06 | Stop reason: HOSPADM

## 2019-10-31 RX ORDER — FACIAL-BODY WIPES
10 EACH TOPICAL EVERY OTHER DAY
Status: DISCONTINUED | OUTPATIENT
Start: 2019-11-01 | End: 2019-11-06 | Stop reason: HOSPADM

## 2019-10-31 RX ORDER — SENNOSIDES 8.6 MG/1
2 TABLET ORAL DAILY
Status: DISCONTINUED | OUTPATIENT
Start: 2019-11-01 | End: 2019-11-06

## 2019-10-31 RX ADMIN — SODIUM CHLORIDE 125 ML/HR: 900 INJECTION, SOLUTION INTRAVENOUS at 22:27

## 2019-10-31 RX ADMIN — CEFEPIME 2 G: 2 INJECTION, POWDER, FOR SOLUTION INTRAVENOUS at 19:38

## 2019-10-31 RX ADMIN — SODIUM CHLORIDE 1000 ML: 900 INJECTION, SOLUTION INTRAVENOUS at 18:45

## 2019-10-31 RX ADMIN — DIVALPROEX SODIUM 250 MG: 250 TABLET, DELAYED RELEASE ORAL at 23:42

## 2019-10-31 RX ADMIN — Medication 10 ML: at 22:28

## 2019-10-31 RX ADMIN — GUAIFENESIN 1200 MG: 600 TABLET ORAL at 22:28

## 2019-10-31 RX ADMIN — OLANZAPINE 2.5 MG: 2.5 TABLET, FILM COATED ORAL at 22:28

## 2019-10-31 RX ADMIN — OXYBUTYNIN CHLORIDE 5 MG: 5 TABLET ORAL at 22:28

## 2019-10-31 RX ADMIN — VANCOMYCIN HYDROCHLORIDE 2000 MG: 10 INJECTION, POWDER, LYOPHILIZED, FOR SOLUTION INTRAVENOUS at 20:54

## 2019-10-31 RX ADMIN — DOCUSATE SODIUM 100 MG: 100 CAPSULE, LIQUID FILLED ORAL at 22:28

## 2019-10-31 NOTE — ED NOTES
TRANSFER - OUT REPORT:    Verbal report given to [facility], RN(name) on Sunitha Mayorga  being transferred to 2(unit) for routine progression of care       Report consisted of patients Situation, Background, Assessment and   Recommendations(SBAR). Information from the following report(s) SBAR and ED Summary was reviewed with the receiving nurse. Lines:   Peripheral IV 10/31/19 Left Hand (Active)   Site Assessment Clean, dry, & intact 10/31/2019  5:47 PM   Phlebitis Assessment 0 10/31/2019  5:47 PM   Infiltration Assessment 0 10/31/2019  5:47 PM   Dressing Status Clean, dry, & intact 10/31/2019  5:47 PM        Opportunity for questions and clarification was provided.       Patient transported with:   O2 @ 3 liters

## 2019-10-31 NOTE — ED NOTES
Report given to [facility] RN. Transfer of care at this time.

## 2019-10-31 NOTE — ED TRIAGE NOTES
Pt [facility]. Pt was here this am for catheter change. Pt now has left sided chest pain and left shoulder pain that began while laying in the bed Denied injury. Pt in a-fib 120-160. No hx of  A-fib. . 128/80. Pt is quadriplegic. 1 mg of ativan and 0.5 of morphine given by staff today before ems arrived. Alert and and oriented to person and place but not to time.  pts urine has a lot of blood in it and
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
## 2019-11-01 PROCEDURE — 77030037759

## 2019-11-01 PROCEDURE — 94640 AIRWAY INHALATION TREATMENT: CPT

## 2019-11-01 PROCEDURE — 74011000250 HC RX REV CODE- 250: Performed by: HOSPITALIST

## 2019-11-01 PROCEDURE — 74011000258 HC RX REV CODE- 258: Performed by: HOSPITALIST

## 2019-11-01 PROCEDURE — 74011250637 HC RX REV CODE- 250/637: Performed by: HOSPITALIST

## 2019-11-01 PROCEDURE — 74011250637 HC RX REV CODE- 250/637: Performed by: NURSE PRACTITIONER

## 2019-11-01 PROCEDURE — 77010033678 HC OXYGEN DAILY

## 2019-11-01 PROCEDURE — 65660000000 HC RM CCU STEPDOWN

## 2019-11-01 PROCEDURE — 74011250636 HC RX REV CODE- 250/636: Performed by: HOSPITALIST

## 2019-11-01 PROCEDURE — 77030027138 HC INCENT SPIROMETER -A

## 2019-11-01 PROCEDURE — 77030020263 HC SOL INJ SOD CL0.9% LFCR 1000ML

## 2019-11-01 PROCEDURE — 76937 US GUIDE VASCULAR ACCESS: CPT

## 2019-11-01 PROCEDURE — 94760 N-INVAS EAR/PLS OXIMETRY 1: CPT

## 2019-11-01 RX ORDER — MORPHINE SULFATE ORAL SOLUTION 10 MG/5ML
0.5 SOLUTION ORAL
COMMUNITY

## 2019-11-01 RX ORDER — FUROSEMIDE 20 MG/1
20 TABLET ORAL DAILY
COMMUNITY

## 2019-11-01 RX ORDER — METOPROLOL TARTRATE 25 MG/1
50 TABLET, FILM COATED ORAL 2 TIMES DAILY
COMMUNITY

## 2019-11-01 RX ORDER — LORAZEPAM 2 MG/ML
2 CONCENTRATE ORAL
COMMUNITY

## 2019-11-01 RX ORDER — METOPROLOL TARTRATE 25 MG/1
12.5 TABLET, FILM COATED ORAL EVERY 12 HOURS
Status: DISCONTINUED | OUTPATIENT
Start: 2019-11-01 | End: 2019-11-06 | Stop reason: HOSPADM

## 2019-11-01 RX ADMIN — OXYBUTYNIN CHLORIDE 5 MG: 5 TABLET ORAL at 17:57

## 2019-11-01 RX ADMIN — OXYBUTYNIN CHLORIDE 5 MG: 5 TABLET ORAL at 09:53

## 2019-11-01 RX ADMIN — Medication 10 ML: at 05:50

## 2019-11-01 RX ADMIN — DOCUSATE SODIUM 100 MG: 100 CAPSULE, LIQUID FILLED ORAL at 09:53

## 2019-11-01 RX ADMIN — Medication 10 ML: at 13:37

## 2019-11-01 RX ADMIN — VANCOMYCIN HYDROCHLORIDE 1250 MG: 10 INJECTION, POWDER, LYOPHILIZED, FOR SOLUTION INTRAVENOUS at 21:53

## 2019-11-01 RX ADMIN — SENNOSIDES 17.2 MG: 8.6 TABLET, FILM COATED ORAL at 09:52

## 2019-11-01 RX ADMIN — ALBUTEROL SULFATE 2.5 MG: 2.5 SOLUTION RESPIRATORY (INHALATION) at 08:08

## 2019-11-01 RX ADMIN — CEFEPIME 2 G: 2 INJECTION, POWDER, FOR SOLUTION INTRAVENOUS at 11:47

## 2019-11-01 RX ADMIN — Medication 10 ML: at 21:19

## 2019-11-01 RX ADMIN — VANCOMYCIN HYDROCHLORIDE 1250 MG: 10 INJECTION, POWDER, LYOPHILIZED, FOR SOLUTION INTRAVENOUS at 10:02

## 2019-11-01 RX ADMIN — MULTIPLE VITAMINS W/ MINERALS TAB 1 TABLET: TAB at 09:53

## 2019-11-01 RX ADMIN — FAMOTIDINE 20 MG: 20 TABLET, FILM COATED ORAL at 09:53

## 2019-11-01 RX ADMIN — GUAIFENESIN 1200 MG: 600 TABLET ORAL at 09:52

## 2019-11-01 RX ADMIN — OLANZAPINE 2.5 MG: 2.5 TABLET, FILM COATED ORAL at 21:16

## 2019-11-01 RX ADMIN — METOPROLOL TARTRATE 12.5 MG: 25 TABLET ORAL at 21:15

## 2019-11-01 RX ADMIN — CEFEPIME 2 G: 2 INJECTION, POWDER, FOR SOLUTION INTRAVENOUS at 05:51

## 2019-11-01 RX ADMIN — MAGNESIUM GLUCONATE 500 MG ORAL TABLET 400 MG: 500 TABLET ORAL at 09:54

## 2019-11-01 RX ADMIN — DOCUSATE SODIUM 100 MG: 100 CAPSULE, LIQUID FILLED ORAL at 17:57

## 2019-11-01 RX ADMIN — Medication 1 AMPULE: at 09:51

## 2019-11-01 RX ADMIN — FERROUS SULFATE TAB 325 MG (65 MG ELEMENTAL FE) 325 MG: 325 (65 FE) TAB at 09:53

## 2019-11-01 RX ADMIN — IPRATROPIUM BROMIDE AND ALBUTEROL SULFATE 3 ML: .5; 3 SOLUTION RESPIRATORY (INHALATION) at 20:17

## 2019-11-01 RX ADMIN — GUAIFENESIN 1200 MG: 600 TABLET ORAL at 17:57

## 2019-11-01 RX ADMIN — POLYETHYLENE GLYCOL 3350 17 G: 17 POWDER, FOR SOLUTION ORAL at 09:54

## 2019-11-01 RX ADMIN — DIVALPROEX SODIUM 250 MG: 250 TABLET, DELAYED RELEASE ORAL at 21:15

## 2019-11-01 RX ADMIN — VITAMIN D, TAB 1000IU (100/BT) 2 TABLET: 25 TAB at 09:51

## 2019-11-01 RX ADMIN — METOPROLOL TARTRATE 12.5 MG: 25 TABLET ORAL at 11:50

## 2019-11-01 RX ADMIN — CEFEPIME 2 G: 2 INJECTION, POWDER, FOR SOLUTION INTRAVENOUS at 21:15

## 2019-11-01 RX ADMIN — Medication 1 AMPULE: at 21:15

## 2019-11-01 RX ADMIN — OXYBUTYNIN CHLORIDE 5 MG: 5 TABLET ORAL at 21:15

## 2019-11-01 RX ADMIN — SERTRALINE HYDROCHLORIDE 50 MG: 50 TABLET ORAL at 09:53
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
## 2019-11-01 NOTE — PROGRESS NOTES
Pharmacokinetic Consult to Pharmacist    Rubens Preciado is a 70 y.o. male being treated for UTI with vancomycin. Height: 5' 7\" (170.2 cm)  Weight: 88.5 kg (195 lb)  Lab Results   Component Value Date/Time    BUN 15 10/31/2019 05:23 PM    Creatinine 0.80 10/31/2019 05:23 PM    WBC 8.8 10/31/2019 05:23 PM    Procalcitonin <0.1 06/19/2018 08:32 PM    Lactic acid 1.8 04/05/2016 09:30 PM    Lactic acid 0.6 05/21/2012 03:48 PM    Lactic Acid (POC) 3.11 (H) 10/31/2019 05:31 PM      Estimated Creatinine Clearance: 83.4 mL/min (based on SCr of 0.8 mg/dL). Day 1 of vancomycin. Goal trough is 10-20. Vancomycin dose initiated at 2000 mg x 1 dose; followed by Vanc 1250 mg IV q12h. Will continue to follow patient. Thank you,   Alethea Laws, PharmD.

## 2019-11-01 NOTE — PROGRESS NOTES
10/31/19 2000   Dual Skin Pressure Injury Assessment   Dual Skin Pressure Injury Assessment X   Second Care Provider (Based on [address] [place] [address]) Gwen Cates RN   Skin Integumentary   Skin Integumentary (WDL) X   Skin Color Appropriate for ethnicity;Ashen   Skin Condition/Temp Cool;Dry   Skin Integrity Tattoos (comment); Wound (add Wound LDA); Abrasion; Incision (comment)   Turgor Epidermis thin w/ loss of subcut tissue   Hair Growth Sparce   Varicosities Absent   Pt has pressure wound on L leg. Pt has a indentation from previous wound on L. Buttock. . Has tattoos on BUE. Incision from Suprapubic catheter.

## 2019-11-01 NOTE — PROGRESS NOTES
TRANSFER - IN REPORT:    Verbal report received from Eduardo RN name) on Roxy Martínez  being received from ER(unit) for routine progression of care      Report consisted of patients Situation, Background, Assessment and   Recommendations(SBAR). Information from the following report(s) SBAR was reviewed with the receiving nurse. Opportunity for questions and clarification was provided. Assessment completed upon patients arrival to unit and care assumed.

## 2019-11-01 NOTE — PROGRESS NOTES
Pt is unable to fully answer admission required documentation due to feeling tired, having some confusion, and not wanting to answer any questions. Will try at a later time.

## 2019-11-01 NOTE — PROGRESS NOTES
Pt admitted to hospital from [facility]. Pt can discharge to facility once medically stabled. Pt has a 10 day bed hold.        Care Management Interventions  PCP Verified by CM: No  Mode of Transport at Discharge: BLS  Transition of Care Consult (CM Consult): [facility]  Partner SNF: Yes  Discharge Durable Medical Equipment: No  Physical Therapy Consult: No  Occupational Therapy Consult: No  Speech Therapy Consult: No  Current Support Network: Nursing Facility  Confirm Follow Up Transport: Other (see comment)  Plan discussed with Pt/Family/Caregiver: Yes  Freedom of Choice Offered: Yes  Discharge Location  [facility]

## 2019-11-01 NOTE — PROGRESS NOTES
Pt reports needing \"inhaler\" as he is coughing causing SOB. Respiratory aware and reports will bring breathing tx. Pt in no distress.
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
## 2019-11-01 NOTE — PROGRESS NOTES
Pt refusing lab draws as pt is \"tired\" of being stuck. Lab will try again later. Pt educated of importance.

## 2019-11-01 NOTE — PROGRESS NOTES
Hourly rounds performed. All needs met. Pt had no complaints during shift. Will continue to monitor and report to oncoming nurse.

## 2019-11-02 LAB
ANION GAP SERPL CALC-SCNC: 8 MMOL/L (ref 7–16)
BACTERIA SPEC CULT: NORMAL
BACTERIA SPEC CULT: NORMAL
BASOPHILS # BLD: 0 K/UL (ref 0–0.2)
BASOPHILS NFR BLD: 1 % (ref 0–2)
BUN SERPL-MCNC: 10 MG/DL (ref 8–23)
CALCIUM SERPL-MCNC: 8.1 MG/DL (ref 8.3–10.4)
CHLORIDE SERPL-SCNC: 106 MMOL/L (ref 98–107)
CO2 SERPL-SCNC: 25 MMOL/L (ref 21–32)
CREAT SERPL-MCNC: 0.53 MG/DL (ref 0.8–1.5)
DIFFERENTIAL METHOD BLD: ABNORMAL
EOSINOPHIL # BLD: 0.4 K/UL (ref 0–0.8)
EOSINOPHIL NFR BLD: 5 % (ref 0.5–7.8)
ERYTHROCYTE [DISTWIDTH] IN BLOOD BY AUTOMATED COUNT: 15.3 % (ref 11.9–14.6)
GLUCOSE SERPL-MCNC: 133 MG/DL (ref 65–100)
HCT VFR BLD AUTO: 36.2 % (ref 41.1–50.3)
HGB BLD-MCNC: 11.3 G/DL (ref 13.6–17.2)
IMM GRANULOCYTES # BLD AUTO: 0.1 K/UL (ref 0–0.5)
IMM GRANULOCYTES NFR BLD AUTO: 1 % (ref 0–5)
LYMPHOCYTES # BLD: 2 K/UL (ref 0.5–4.6)
LYMPHOCYTES NFR BLD: 24 % (ref 13–44)
MCH RBC QN AUTO: 28.5 PG (ref 26.1–32.9)
MCHC RBC AUTO-ENTMCNC: 31.2 G/DL (ref 31.4–35)
MCV RBC AUTO: 91.2 FL (ref 79.6–97.8)
MONOCYTES # BLD: 0.7 K/UL (ref 0.1–1.3)
MONOCYTES NFR BLD: 9 % (ref 4–12)
NEUTS SEG # BLD: 5.3 K/UL (ref 1.7–8.2)
NEUTS SEG NFR BLD: 62 % (ref 43–78)
NRBC # BLD: 0 K/UL (ref 0–0.2)
PLATELET # BLD AUTO: 223 K/UL (ref 150–450)
PMV BLD AUTO: 8.7 FL (ref 9.4–12.3)
POTASSIUM SERPL-SCNC: 3.2 MMOL/L (ref 3.5–5.1)
RBC # BLD AUTO: 3.97 M/UL (ref 4.23–5.6)
SERVICE CMNT-IMP: NORMAL
SODIUM SERPL-SCNC: 139 MMOL/L (ref 136–145)
VANCOMYCIN TROUGH SERPL-MCNC: 31.2 UG/ML (ref 5–20)
WBC # BLD AUTO: 8.5 K/UL (ref 4.3–11.1)

## 2019-11-02 PROCEDURE — 80048 BASIC METABOLIC PNL TOTAL CA: CPT

## 2019-11-02 PROCEDURE — 74011000258 HC RX REV CODE- 258: Performed by: HOSPITALIST

## 2019-11-02 PROCEDURE — 74011250637 HC RX REV CODE- 250/637: Performed by: NURSE PRACTITIONER

## 2019-11-02 PROCEDURE — 77030020263 HC SOL INJ SOD CL0.9% LFCR 1000ML

## 2019-11-02 PROCEDURE — 65660000000 HC RM CCU STEPDOWN

## 2019-11-02 PROCEDURE — 36415 COLL VENOUS BLD VENIPUNCTURE: CPT

## 2019-11-02 PROCEDURE — 85025 COMPLETE CBC W/AUTO DIFF WBC: CPT

## 2019-11-02 PROCEDURE — 74011250636 HC RX REV CODE- 250/636: Performed by: HOSPITALIST

## 2019-11-02 PROCEDURE — 80202 ASSAY OF VANCOMYCIN: CPT

## 2019-11-02 PROCEDURE — 74011250637 HC RX REV CODE- 250/637: Performed by: HOSPITALIST

## 2019-11-02 PROCEDURE — 87086 URINE CULTURE/COLONY COUNT: CPT

## 2019-11-02 RX ADMIN — VITAMIN D, TAB 1000IU (100/BT) 2 TABLET: 25 TAB at 08:31

## 2019-11-02 RX ADMIN — Medication 10 ML: at 21:26

## 2019-11-02 RX ADMIN — POLYETHYLENE GLYCOL 3350 17 G: 17 POWDER, FOR SOLUTION ORAL at 05:40

## 2019-11-02 RX ADMIN — SERTRALINE HYDROCHLORIDE 50 MG: 50 TABLET ORAL at 08:32

## 2019-11-02 RX ADMIN — METOPROLOL TARTRATE 12.5 MG: 25 TABLET ORAL at 21:17

## 2019-11-02 RX ADMIN — ONDANSETRON 4 MG: 2 INJECTION INTRAMUSCULAR; INTRAVENOUS at 08:28

## 2019-11-02 RX ADMIN — Medication 10 ML: at 05:40

## 2019-11-02 RX ADMIN — CEFEPIME 2 G: 2 INJECTION, POWDER, FOR SOLUTION INTRAVENOUS at 03:30

## 2019-11-02 RX ADMIN — OXYBUTYNIN CHLORIDE 5 MG: 5 TABLET ORAL at 21:17

## 2019-11-02 RX ADMIN — SODIUM CHLORIDE 125 ML/HR: 900 INJECTION, SOLUTION INTRAVENOUS at 03:21

## 2019-11-02 RX ADMIN — ASPIRIN 81 MG: 81 TABLET, COATED ORAL at 08:33

## 2019-11-02 RX ADMIN — OXYBUTYNIN CHLORIDE 5 MG: 5 TABLET ORAL at 08:31

## 2019-11-02 RX ADMIN — VANCOMYCIN HYDROCHLORIDE 1250 MG: 10 INJECTION, POWDER, LYOPHILIZED, FOR SOLUTION INTRAVENOUS at 08:28

## 2019-11-02 RX ADMIN — MAGNESIUM GLUCONATE 500 MG ORAL TABLET 400 MG: 500 TABLET ORAL at 08:31

## 2019-11-02 RX ADMIN — Medication 10 ML: at 13:20

## 2019-11-02 RX ADMIN — Medication 1 AMPULE: at 08:31

## 2019-11-02 RX ADMIN — DOCUSATE SODIUM 100 MG: 100 CAPSULE, LIQUID FILLED ORAL at 17:21

## 2019-11-02 RX ADMIN — CEFEPIME 2 G: 2 INJECTION, POWDER, FOR SOLUTION INTRAVENOUS at 21:16

## 2019-11-02 RX ADMIN — MULTIPLE VITAMINS W/ MINERALS TAB 1 TABLET: TAB at 08:31

## 2019-11-02 RX ADMIN — GUAIFENESIN 1200 MG: 600 TABLET ORAL at 17:21

## 2019-11-02 RX ADMIN — METOPROLOL TARTRATE 12.5 MG: 25 TABLET ORAL at 08:32

## 2019-11-02 RX ADMIN — ACETAMINOPHEN 650 MG: 325 TABLET, FILM COATED ORAL at 04:41

## 2019-11-02 RX ADMIN — SENNOSIDES 17.2 MG: 8.6 TABLET, FILM COATED ORAL at 08:31

## 2019-11-02 RX ADMIN — GUAIFENESIN 1200 MG: 600 TABLET ORAL at 08:31

## 2019-11-02 RX ADMIN — OXYBUTYNIN CHLORIDE 5 MG: 5 TABLET ORAL at 17:21

## 2019-11-02 RX ADMIN — DOCUSATE SODIUM 100 MG: 100 CAPSULE, LIQUID FILLED ORAL at 08:32

## 2019-11-02 RX ADMIN — FAMOTIDINE 20 MG: 20 TABLET, FILM COATED ORAL at 08:31

## 2019-11-02 RX ADMIN — CEFEPIME 2 G: 2 INJECTION, POWDER, FOR SOLUTION INTRAVENOUS at 11:25

## 2019-11-02 RX ADMIN — DIVALPROEX SODIUM 250 MG: 250 TABLET, DELAYED RELEASE ORAL at 21:17

## 2019-11-02 RX ADMIN — Medication 1 AMPULE: at 21:17

## 2019-11-02 RX ADMIN — OLANZAPINE 2.5 MG: 2.5 TABLET, FILM COATED ORAL at 21:17

## 2019-11-02 RX ADMIN — ACETAMINOPHEN 650 MG: 325 TABLET, FILM COATED ORAL at 23:43
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
## 2019-11-02 NOTE — PROGRESS NOTES
Pharmacokinetic Consult to Pharmacist    Mila Miriam is a 70 y.o. male being treated for UTI/sepsis with vancomycin and cefepime. Height: 5' 7\" (170.2 cm)  Weight: 81.2 kg (179 lb)  Lab Results   Component Value Date/Time    BUN 10 11/02/2019 05:37 AM    Creatinine 0.53 (L) 11/02/2019 05:37 AM    WBC 8.5 11/02/2019 05:37 AM    Procalcitonin <0.1 06/19/2018 08:32 PM    Lactic acid 1.5 10/31/2019 10:50 PM    Lactic acid 1.8 04/05/2016 09:30 PM    Lactic Acid (POC) 3.11 (H) 10/31/2019 05:31 PM      Estimated Creatinine Clearance: 91.6 mL/min (A) (by C-G formula based on SCr of 0.53 mg/dL (L)). Lab Results   Component Value Date/Time    Vancomycin,trough 31.2 (HH) 11/02/2019 07:53 AM       Day 3 of vancomycin. Goal trough is 15-20. Trough elevated. Decrease regimen to 1g q18h, with next dose at 1200 tomorrow. Will continue to follow patient. Thank you,  Lulu Tejeda, Pharm. D.   Clinical Pharmacist  [phone]

## 2019-11-02 NOTE — PROGRESS NOTES
Hourly rounds performed. All needs met. Denies any needs at this time. Will continue to monitor and report to oncoming nurse.

## 2019-11-03 LAB — CREAT SERPL-MCNC: 0.47 MG/DL (ref 0.8–1.5)

## 2019-11-03 PROCEDURE — 74011250637 HC RX REV CODE- 250/637: Performed by: NURSE PRACTITIONER

## 2019-11-03 PROCEDURE — 77030020263 HC SOL INJ SOD CL0.9% LFCR 1000ML

## 2019-11-03 PROCEDURE — 82565 ASSAY OF CREATININE: CPT

## 2019-11-03 PROCEDURE — 74011000258 HC RX REV CODE- 258: Performed by: HOSPITALIST

## 2019-11-03 PROCEDURE — 74011250637 HC RX REV CODE- 250/637: Performed by: HOSPITALIST

## 2019-11-03 PROCEDURE — 36415 COLL VENOUS BLD VENIPUNCTURE: CPT

## 2019-11-03 PROCEDURE — 74011250636 HC RX REV CODE- 250/636: Performed by: HOSPITALIST

## 2019-11-03 PROCEDURE — 65660000000 HC RM CCU STEPDOWN

## 2019-11-03 RX ADMIN — MAGNESIUM GLUCONATE 500 MG ORAL TABLET 400 MG: 500 TABLET ORAL at 08:30

## 2019-11-03 RX ADMIN — DIVALPROEX SODIUM 250 MG: 250 TABLET, DELAYED RELEASE ORAL at 21:14

## 2019-11-03 RX ADMIN — GUAIFENESIN 1200 MG: 600 TABLET ORAL at 08:29

## 2019-11-03 RX ADMIN — Medication 1 AMPULE: at 21:14

## 2019-11-03 RX ADMIN — Medication 10 ML: at 05:44

## 2019-11-03 RX ADMIN — Medication 10 ML: at 14:19

## 2019-11-03 RX ADMIN — CEFEPIME 2 G: 2 INJECTION, POWDER, FOR SOLUTION INTRAVENOUS at 11:59

## 2019-11-03 RX ADMIN — BISACODYL 10 MG: 10 SUPPOSITORY RECTAL at 08:30

## 2019-11-03 RX ADMIN — DOCUSATE SODIUM 100 MG: 100 CAPSULE, LIQUID FILLED ORAL at 08:30

## 2019-11-03 RX ADMIN — SENNOSIDES 17.2 MG: 8.6 TABLET, FILM COATED ORAL at 08:29

## 2019-11-03 RX ADMIN — TRAZODONE HYDROCHLORIDE 50 MG: 50 TABLET ORAL at 21:14

## 2019-11-03 RX ADMIN — VITAMIN D, TAB 1000IU (100/BT) 2 TABLET: 25 TAB at 08:29

## 2019-11-03 RX ADMIN — FAMOTIDINE 20 MG: 20 TABLET, FILM COATED ORAL at 08:30

## 2019-11-03 RX ADMIN — OXYBUTYNIN CHLORIDE 5 MG: 5 TABLET ORAL at 08:29

## 2019-11-03 RX ADMIN — Medication 1 AMPULE: at 08:29

## 2019-11-03 RX ADMIN — MULTIPLE VITAMINS W/ MINERALS TAB 1 TABLET: TAB at 08:29

## 2019-11-03 RX ADMIN — CEFEPIME 2 G: 2 INJECTION, POWDER, FOR SOLUTION INTRAVENOUS at 04:41

## 2019-11-03 RX ADMIN — CEFEPIME 2 G: 2 INJECTION, POWDER, FOR SOLUTION INTRAVENOUS at 21:14

## 2019-11-03 RX ADMIN — OLANZAPINE 2.5 MG: 2.5 TABLET, FILM COATED ORAL at 21:14

## 2019-11-03 RX ADMIN — FERROUS SULFATE TAB 325 MG (65 MG ELEMENTAL FE) 325 MG: 325 (65 FE) TAB at 08:30

## 2019-11-03 RX ADMIN — POLYETHYLENE GLYCOL 3350 17 G: 17 POWDER, FOR SOLUTION ORAL at 05:43

## 2019-11-03 RX ADMIN — VANCOMYCIN HYDROCHLORIDE 1000 MG: 1 INJECTION, POWDER, LYOPHILIZED, FOR SOLUTION INTRAVENOUS at 12:01

## 2019-11-03 RX ADMIN — METOPROLOL TARTRATE 12.5 MG: 25 TABLET ORAL at 08:29

## 2019-11-03 RX ADMIN — ASPIRIN 81 MG: 81 TABLET, COATED ORAL at 08:30

## 2019-11-03 RX ADMIN — METOPROLOL TARTRATE 12.5 MG: 25 TABLET ORAL at 21:15

## 2019-11-03 RX ADMIN — GUAIFENESIN 1200 MG: 600 TABLET ORAL at 17:17

## 2019-11-03 RX ADMIN — Medication 10 ML: at 21:15

## 2019-11-03 RX ADMIN — OXYBUTYNIN CHLORIDE 5 MG: 5 TABLET ORAL at 21:14

## 2019-11-03 RX ADMIN — SERTRALINE HYDROCHLORIDE 50 MG: 50 TABLET ORAL at 08:29

## 2019-11-03 RX ADMIN — DOCUSATE SODIUM 100 MG: 100 CAPSULE, LIQUID FILLED ORAL at 17:17

## 2019-11-03 RX ADMIN — OXYBUTYNIN CHLORIDE 5 MG: 5 TABLET ORAL at 17:17
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
## 2019-11-03 NOTE — PROGRESS NOTES
Hourly rounds performed during this shift; all needs met at this time. Bed in low/locked position and call light, personal items within reach.

## 2019-11-04 LAB
BACTERIA SPEC CULT: NORMAL
SERVICE CMNT-IMP: NORMAL
VANCOMYCIN TROUGH SERPL-MCNC: 24 UG/ML (ref 5–20)

## 2019-11-04 PROCEDURE — 36415 COLL VENOUS BLD VENIPUNCTURE: CPT

## 2019-11-04 PROCEDURE — 77030027688 HC DRSG MEPILEX 16-48IN NO BORD MOLN -A

## 2019-11-04 PROCEDURE — 80202 ASSAY OF VANCOMYCIN: CPT

## 2019-11-04 PROCEDURE — 74011000258 HC RX REV CODE- 258: Performed by: HOSPITALIST

## 2019-11-04 PROCEDURE — 74011250637 HC RX REV CODE- 250/637: Performed by: NURSE PRACTITIONER

## 2019-11-04 PROCEDURE — 65660000000 HC RM CCU STEPDOWN

## 2019-11-04 PROCEDURE — 65270000029 HC RM PRIVATE

## 2019-11-04 PROCEDURE — 74011250637 HC RX REV CODE- 250/637: Performed by: HOSPITALIST

## 2019-11-04 PROCEDURE — 74011250636 HC RX REV CODE- 250/636: Performed by: HOSPITALIST

## 2019-11-04 PROCEDURE — 77030019605

## 2019-11-04 RX ORDER — POLYETHYLENE GLYCOL 3350 17 G/17G
17 POWDER, FOR SOLUTION ORAL DAILY
Status: DISCONTINUED | OUTPATIENT
Start: 2019-11-04 | End: 2019-11-06 | Stop reason: HOSPADM

## 2019-11-04 RX ADMIN — CEFEPIME 2 G: 2 INJECTION, POWDER, FOR SOLUTION INTRAVENOUS at 04:04

## 2019-11-04 RX ADMIN — FAMOTIDINE 20 MG: 20 TABLET, FILM COATED ORAL at 08:26

## 2019-11-04 RX ADMIN — CEFEPIME 2 G: 2 INJECTION, POWDER, FOR SOLUTION INTRAVENOUS at 20:26

## 2019-11-04 RX ADMIN — MULTIPLE VITAMINS W/ MINERALS TAB 1 TABLET: TAB at 08:28

## 2019-11-04 RX ADMIN — MAGNESIUM GLUCONATE 500 MG ORAL TABLET 400 MG: 500 TABLET ORAL at 08:25

## 2019-11-04 RX ADMIN — ACETAMINOPHEN 650 MG: 325 TABLET, FILM COATED ORAL at 23:49

## 2019-11-04 RX ADMIN — ACETAMINOPHEN 650 MG: 325 TABLET, FILM COATED ORAL at 00:42

## 2019-11-04 RX ADMIN — Medication 1 AMPULE: at 08:26

## 2019-11-04 RX ADMIN — CEFEPIME 2 G: 2 INJECTION, POWDER, FOR SOLUTION INTRAVENOUS at 11:37

## 2019-11-04 RX ADMIN — DOCUSATE SODIUM 100 MG: 100 CAPSULE, LIQUID FILLED ORAL at 08:26

## 2019-11-04 RX ADMIN — OXYBUTYNIN CHLORIDE 5 MG: 5 TABLET ORAL at 08:25

## 2019-11-04 RX ADMIN — DIVALPROEX SODIUM 250 MG: 250 TABLET, DELAYED RELEASE ORAL at 20:29

## 2019-11-04 RX ADMIN — VANCOMYCIN HYDROCHLORIDE 1000 MG: 1 INJECTION, POWDER, LYOPHILIZED, FOR SOLUTION INTRAVENOUS at 05:28

## 2019-11-04 RX ADMIN — GUAIFENESIN 1200 MG: 600 TABLET ORAL at 08:26

## 2019-11-04 RX ADMIN — SERTRALINE HYDROCHLORIDE 50 MG: 50 TABLET ORAL at 08:26

## 2019-11-04 RX ADMIN — SENNOSIDES 17.2 MG: 8.6 TABLET, FILM COATED ORAL at 08:25

## 2019-11-04 RX ADMIN — OXYBUTYNIN CHLORIDE 5 MG: 5 TABLET ORAL at 17:23

## 2019-11-04 RX ADMIN — SODIUM CHLORIDE 125 ML/HR: 900 INJECTION, SOLUTION INTRAVENOUS at 20:25

## 2019-11-04 RX ADMIN — OXYBUTYNIN CHLORIDE 5 MG: 5 TABLET ORAL at 20:30

## 2019-11-04 RX ADMIN — Medication 10 ML: at 14:39

## 2019-11-04 RX ADMIN — OLANZAPINE 2.5 MG: 2.5 TABLET, FILM COATED ORAL at 20:30

## 2019-11-04 RX ADMIN — VITAMIN D, TAB 1000IU (100/BT) 2 TABLET: 25 TAB at 08:25

## 2019-11-04 RX ADMIN — METOPROLOL TARTRATE 12.5 MG: 25 TABLET ORAL at 20:29

## 2019-11-04 RX ADMIN — METOPROLOL TARTRATE 12.5 MG: 25 TABLET ORAL at 08:26

## 2019-11-04 RX ADMIN — GUAIFENESIN 1200 MG: 600 TABLET ORAL at 17:23

## 2019-11-04 RX ADMIN — Medication 10 ML: at 23:49

## 2019-11-04 RX ADMIN — Medication 1 AMPULE: at 20:29

## 2019-11-04 RX ADMIN — ASPIRIN 81 MG: 81 TABLET, COATED ORAL at 09:00

## 2019-11-04 RX ADMIN — DOCUSATE SODIUM 100 MG: 100 CAPSULE, LIQUID FILLED ORAL at 18:00

## 2019-11-04 RX ADMIN — POLYETHYLENE GLYCOL 3350 17 G: 17 POWDER, FOR SOLUTION ORAL at 08:35

## 2019-11-04 NOTE — PROGRESS NOTES
Redness and excoriation noted to gluteal fold and right buttock. Applied barrier cream and alleyvn. Alleyvn applied to mid back as redness was noted. Patient turned every 2 hours. Hourly rounds performed this shift. Bed lowered and locked, side rails x2, and call light in reach. All patient needs are met at this time. Bedside shift report will be given to oncoming nurse.

## 2019-11-04 NOTE — PROGRESS NOTES
Hourly rounds completed this shift. Patient resting in bed. Patient with small amount of leaking from suprapubic, gauze applied to keep area dry. All needs met at this time. Will continue to monitor and give report to oncoming RN.

## 2019-11-04 NOTE — PROGRESS NOTES
Gauze dressing applied to suprapubic area as catheter is leaking from site. Notified Roly Collazo, nursing supervisor. Site is intact and no redness noted. Will continue to assess for increase in leaking.
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
## 2019-11-05 ENCOUNTER — APPOINTMENT (OUTPATIENT)
Dept: CT IMAGING | Age: 79
DRG: 698 | End: 2019-11-05
Attending: INTERNAL MEDICINE
Payer: MEDICARE

## 2019-11-05 LAB
ALBUMIN SERPL-MCNC: 2.1 G/DL (ref 3.2–4.6)
ALBUMIN/GLOB SERPL: 0.4 {RATIO} (ref 1.2–3.5)
ALP SERPL-CCNC: 96 U/L (ref 50–136)
ALT SERPL-CCNC: 14 U/L (ref 12–65)
ANION GAP SERPL CALC-SCNC: 9 MMOL/L (ref 7–16)
AST SERPL-CCNC: 18 U/L (ref 15–37)
BACTERIA SPEC CULT: NORMAL
BACTERIA SPEC CULT: NORMAL
BILIRUB SERPL-MCNC: 0.6 MG/DL (ref 0.2–1.1)
BUN SERPL-MCNC: 10 MG/DL (ref 8–23)
CALCIUM SERPL-MCNC: 8.1 MG/DL (ref 8.3–10.4)
CHLORIDE SERPL-SCNC: 102 MMOL/L (ref 98–107)
CO2 SERPL-SCNC: 24 MMOL/L (ref 21–32)
CREAT SERPL-MCNC: 0.42 MG/DL (ref 0.8–1.5)
ERYTHROCYTE [DISTWIDTH] IN BLOOD BY AUTOMATED COUNT: 15.6 % (ref 11.9–14.6)
GLOBULIN SER CALC-MCNC: 5.1 G/DL (ref 2.3–3.5)
GLUCOSE SERPL-MCNC: 98 MG/DL (ref 65–100)
HCT VFR BLD AUTO: 37.5 % (ref 41.1–50.3)
HGB BLD-MCNC: 11.9 G/DL (ref 13.6–17.2)
MAGNESIUM SERPL-MCNC: 2 MG/DL (ref 1.8–2.4)
MCH RBC QN AUTO: 28.5 PG (ref 26.1–32.9)
MCHC RBC AUTO-ENTMCNC: 31.7 G/DL (ref 31.4–35)
MCV RBC AUTO: 89.7 FL (ref 79.6–97.8)
NRBC # BLD: 0 K/UL (ref 0–0.2)
PLATELET # BLD AUTO: 204 K/UL (ref 150–450)
PMV BLD AUTO: 8.7 FL (ref 9.4–12.3)
POTASSIUM SERPL-SCNC: 3.2 MMOL/L (ref 3.5–5.1)
PROT SERPL-MCNC: 7.2 G/DL (ref 6.3–8.2)
RBC # BLD AUTO: 4.18 M/UL (ref 4.23–5.6)
SERVICE CMNT-IMP: NORMAL
SERVICE CMNT-IMP: NORMAL
SODIUM SERPL-SCNC: 135 MMOL/L (ref 136–145)
WBC # BLD AUTO: 9.1 K/UL (ref 4.3–11.1)

## 2019-11-05 PROCEDURE — 77030020263 HC SOL INJ SOD CL0.9% LFCR 1000ML

## 2019-11-05 PROCEDURE — 80053 COMPREHEN METABOLIC PANEL: CPT

## 2019-11-05 PROCEDURE — 36415 COLL VENOUS BLD VENIPUNCTURE: CPT

## 2019-11-05 PROCEDURE — 74011250637 HC RX REV CODE- 250/637: Performed by: HOSPITALIST

## 2019-11-05 PROCEDURE — 83735 ASSAY OF MAGNESIUM: CPT

## 2019-11-05 PROCEDURE — 65660000000 HC RM CCU STEPDOWN

## 2019-11-05 PROCEDURE — 74011250636 HC RX REV CODE- 250/636: Performed by: HOSPITALIST

## 2019-11-05 PROCEDURE — 74011636320 HC RX REV CODE- 636/320: Performed by: HOSPITALIST

## 2019-11-05 PROCEDURE — 74011000258 HC RX REV CODE- 258: Performed by: HOSPITALIST

## 2019-11-05 PROCEDURE — 74011250637 HC RX REV CODE- 250/637: Performed by: INTERNAL MEDICINE

## 2019-11-05 PROCEDURE — 65270000029 HC RM PRIVATE

## 2019-11-05 PROCEDURE — 85027 COMPLETE CBC AUTOMATED: CPT

## 2019-11-05 PROCEDURE — 74176 CT ABD & PELVIS W/O CONTRAST: CPT

## 2019-11-05 PROCEDURE — 74011250637 HC RX REV CODE- 250/637: Performed by: NURSE PRACTITIONER

## 2019-11-05 RX ORDER — CLONAZEPAM 1 MG/1
0.5 TABLET ORAL
Status: DISCONTINUED | OUTPATIENT
Start: 2019-11-05 | End: 2019-11-06 | Stop reason: HOSPADM

## 2019-11-05 RX ORDER — POTASSIUM CHLORIDE 20 MEQ/1
20 TABLET, EXTENDED RELEASE ORAL 2 TIMES DAILY
Status: DISCONTINUED | OUTPATIENT
Start: 2019-11-05 | End: 2019-11-06 | Stop reason: HOSPADM

## 2019-11-05 RX ADMIN — POTASSIUM CHLORIDE 20 MEQ: 20 TABLET, EXTENDED RELEASE ORAL at 17:15

## 2019-11-05 RX ADMIN — FERROUS SULFATE TAB 325 MG (65 MG ELEMENTAL FE) 325 MG: 325 (65 FE) TAB at 08:18

## 2019-11-05 RX ADMIN — METOPROLOL TARTRATE 12.5 MG: 25 TABLET ORAL at 20:40

## 2019-11-05 RX ADMIN — CEFEPIME 2 G: 2 INJECTION, POWDER, FOR SOLUTION INTRAVENOUS at 11:41

## 2019-11-05 RX ADMIN — DOCUSATE SODIUM 100 MG: 100 CAPSULE, LIQUID FILLED ORAL at 17:15

## 2019-11-05 RX ADMIN — DIVALPROEX SODIUM 250 MG: 250 TABLET, DELAYED RELEASE ORAL at 20:53

## 2019-11-05 RX ADMIN — Medication 10 ML: at 14:01

## 2019-11-05 RX ADMIN — VANCOMYCIN HYDROCHLORIDE 1000 MG: 1 INJECTION, POWDER, LYOPHILIZED, FOR SOLUTION INTRAVENOUS at 11:42

## 2019-11-05 RX ADMIN — OLANZAPINE 2.5 MG: 2.5 TABLET, FILM COATED ORAL at 20:41

## 2019-11-05 RX ADMIN — DIATRIZOATE MEGLUMINE AND DIATRIZOATE SODIUM 15 ML: 660; 100 LIQUID ORAL; RECTAL at 14:01

## 2019-11-05 RX ADMIN — Medication 1 AMPULE: at 20:40

## 2019-11-05 RX ADMIN — SENNOSIDES 17.2 MG: 8.6 TABLET, FILM COATED ORAL at 08:18

## 2019-11-05 RX ADMIN — OXYBUTYNIN CHLORIDE 5 MG: 5 TABLET ORAL at 08:29

## 2019-11-05 RX ADMIN — SODIUM CHLORIDE 125 ML/HR: 900 INJECTION, SOLUTION INTRAVENOUS at 04:44

## 2019-11-05 RX ADMIN — VITAMIN D, TAB 1000IU (100/BT) 2 TABLET: 25 TAB at 08:18

## 2019-11-05 RX ADMIN — CEFEPIME 2 G: 2 INJECTION, POWDER, FOR SOLUTION INTRAVENOUS at 20:28

## 2019-11-05 RX ADMIN — GUAIFENESIN 1200 MG: 600 TABLET ORAL at 17:18

## 2019-11-05 RX ADMIN — Medication 10 ML: at 04:45

## 2019-11-05 RX ADMIN — CEFEPIME 2 G: 2 INJECTION, POWDER, FOR SOLUTION INTRAVENOUS at 04:44

## 2019-11-05 RX ADMIN — Medication 1 AMPULE: at 08:18

## 2019-11-05 RX ADMIN — OXYBUTYNIN CHLORIDE 5 MG: 5 TABLET ORAL at 17:18

## 2019-11-05 RX ADMIN — FAMOTIDINE 20 MG: 20 TABLET, FILM COATED ORAL at 08:17

## 2019-11-05 RX ADMIN — GUAIFENESIN 1200 MG: 600 TABLET ORAL at 08:18

## 2019-11-05 RX ADMIN — OXYBUTYNIN CHLORIDE 5 MG: 5 TABLET ORAL at 20:41

## 2019-11-05 RX ADMIN — METOPROLOL TARTRATE 12.5 MG: 25 TABLET ORAL at 08:19

## 2019-11-05 RX ADMIN — ASPIRIN 81 MG: 81 TABLET, COATED ORAL at 08:18

## 2019-11-05 RX ADMIN — CLONAZEPAM 0.5 MG: 1 TABLET ORAL at 20:41

## 2019-11-05 RX ADMIN — MAGNESIUM GLUCONATE 500 MG ORAL TABLET 400 MG: 500 TABLET ORAL at 08:18

## 2019-11-05 RX ADMIN — MULTIPLE VITAMINS W/ MINERALS TAB 1 TABLET: TAB at 08:18

## 2019-11-05 RX ADMIN — SERTRALINE HYDROCHLORIDE 50 MG: 50 TABLET ORAL at 08:18

## 2019-11-05 RX ADMIN — Medication 10 ML: at 20:42

## 2019-11-05 RX ADMIN — ACETAMINOPHEN 650 MG: 325 TABLET, FILM COATED ORAL at 05:31

## 2019-11-05 RX ADMIN — DOCUSATE SODIUM 100 MG: 100 CAPSULE, LIQUID FILLED ORAL at 08:18

## 2019-11-05 NOTE — PROGRESS NOTES
Interdisciplinary Rounds completed 11/05/19. Nursing, Case Management, Physician and PT present. Plan of care reviewed and updated.     For probable d/c in am.

## 2019-11-05 NOTE — PROGRESS NOTES
Pharmacokinetic Consult to Pharmacist    Shantell Stefania is a 70 y.o. male being treated for UTI/sepsis with vancomycin and cefepime. Height: 5' 7\" (170.2 cm)  Weight: 81.1 kg (178 lb 14.4 oz)  Lab Results   Component Value Date/Time    BUN 10 11/02/2019 05:37 AM    Creatinine 0.47 (L) 11/03/2019 05:28 AM    WBC 8.5 11/02/2019 05:37 AM    Procalcitonin <0.1 06/19/2018 08:32 PM    Lactic acid 1.5 10/31/2019 10:50 PM    Lactic acid 1.8 04/05/2016 09:30 PM    Lactic Acid (POC) 3.11 (H) 10/31/2019 05:31 PM      Estimated Creatinine Clearance: 91.6 mL/min (A) (by C-G formula based on SCr of 0.47 mg/dL (L)). Lab Results   Component Value Date/Time    Vancomycin,trough 24.0 (HH) 11/04/2019 10:55 PM       Day 5 of vancomycin. Goal trough is 15-20. Vanc trough = 24 on 1g q 18 hours. Will decrease to 1g q 24h with next dose at 1100 tomorrow. Will continue to follow patient.       Thank you,  Gustabo Daly, PharmD  Clinical Pharmacist  [phone]
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
## 2019-11-05 NOTE — PROGRESS NOTES
Patient reports feeling depressed this shift and c/o no being able to sleep throughout the night. Notified Dr. Ramón Castillo as patient does have medication trazadone ordered for sleep and states it did not work for him the other night. Hourly rounds performed this shift. Bed lowered and locked, side rails x2, and call light in reach. All patient needs are met at this time. Bedside shift report will be given to oncoming nurse.

## 2019-11-05 NOTE — PROGRESS NOTES
Hourly rounds completed this shift. Patient denies nausea & vomiting, but complains of pain. Medicated per MAR. All needs met at this time. Will continue to monitor & give report to oncoming RN.

## 2019-11-05 NOTE — PROGRESS NOTES
Leaking around SP cath site. Patient stated he has had this issue before. 24 gauge catheter in. Minor redness around site. Barrier cream applied and dressing applied to keep area day. Patient's abdomen is distended. Last BM was yesterday. Notified Dr. Preston Figueroa and orders received.

## 2019-11-06 ENCOUNTER — APPOINTMENT (OUTPATIENT)
Dept: GENERAL RADIOLOGY | Age: 79
DRG: 698 | End: 2019-11-06
Attending: INTERNAL MEDICINE
Payer: MEDICARE

## 2019-11-06 VITALS
SYSTOLIC BLOOD PRESSURE: 121 MMHG | TEMPERATURE: 98.5 F | DIASTOLIC BLOOD PRESSURE: 56 MMHG | RESPIRATION RATE: 18 BRPM | HEIGHT: 67 IN | WEIGHT: 184.4 LBS | OXYGEN SATURATION: 97 % | HEART RATE: 93 BPM | BODY MASS INDEX: 28.94 KG/M2

## 2019-11-06 LAB
ANION GAP SERPL CALC-SCNC: 8 MMOL/L (ref 7–16)
BUN SERPL-MCNC: 10 MG/DL (ref 8–23)
CALCIUM SERPL-MCNC: 8.7 MG/DL (ref 8.3–10.4)
CHLORIDE SERPL-SCNC: 104 MMOL/L (ref 98–107)
CO2 SERPL-SCNC: 24 MMOL/L (ref 21–32)
CREAT SERPL-MCNC: 0.4 MG/DL (ref 0.8–1.5)
ERYTHROCYTE [DISTWIDTH] IN BLOOD BY AUTOMATED COUNT: 15.9 % (ref 11.9–14.6)
GLUCOSE SERPL-MCNC: 84 MG/DL (ref 65–100)
HCT VFR BLD AUTO: 37.3 % (ref 41.1–50.3)
HGB BLD-MCNC: 12.2 G/DL (ref 13.6–17.2)
MCH RBC QN AUTO: 29 PG (ref 26.1–32.9)
MCHC RBC AUTO-ENTMCNC: 32.7 G/DL (ref 31.4–35)
MCV RBC AUTO: 88.8 FL (ref 79.6–97.8)
NRBC # BLD: 0 K/UL (ref 0–0.2)
PLATELET # BLD AUTO: 216 K/UL (ref 150–450)
PMV BLD AUTO: 8.9 FL (ref 9.4–12.3)
POTASSIUM SERPL-SCNC: 3.4 MMOL/L (ref 3.5–5.1)
RBC # BLD AUTO: 4.2 M/UL (ref 4.23–5.6)
SODIUM SERPL-SCNC: 136 MMOL/L (ref 136–145)
WBC # BLD AUTO: 8.9 K/UL (ref 4.3–11.1)

## 2019-11-06 PROCEDURE — 74011250636 HC RX REV CODE- 250/636: Performed by: HOSPITALIST

## 2019-11-06 PROCEDURE — 74011250637 HC RX REV CODE- 250/637: Performed by: INTERNAL MEDICINE

## 2019-11-06 PROCEDURE — 80048 BASIC METABOLIC PNL TOTAL CA: CPT

## 2019-11-06 PROCEDURE — 74011000258 HC RX REV CODE- 258: Performed by: HOSPITALIST

## 2019-11-06 PROCEDURE — 74011250637 HC RX REV CODE- 250/637: Performed by: HOSPITALIST

## 2019-11-06 PROCEDURE — 74018 RADEX ABDOMEN 1 VIEW: CPT

## 2019-11-06 PROCEDURE — 77030019605

## 2019-11-06 PROCEDURE — 36415 COLL VENOUS BLD VENIPUNCTURE: CPT

## 2019-11-06 PROCEDURE — 85027 COMPLETE CBC AUTOMATED: CPT

## 2019-11-06 RX ORDER — IPRATROPIUM BROMIDE AND ALBUTEROL SULFATE 2.5; .5 MG/3ML; MG/3ML
3 SOLUTION RESPIRATORY (INHALATION) 3 TIMES DAILY
Qty: 30 NEBULE | Refills: 1 | Status: SHIPPED
Start: 2019-11-06

## 2019-11-06 RX ORDER — CLONAZEPAM 0.5 MG/1
0.5 TABLET ORAL
Qty: 15 TAB | Refills: 0 | Status: SHIPPED | OUTPATIENT
Start: 2019-11-06

## 2019-11-06 RX ORDER — NALOXONE HYDROCHLORIDE 4 MG/.1ML
SPRAY NASAL
Qty: 1 EACH | Refills: 0 | Status: SHIPPED | OUTPATIENT
Start: 2019-11-06

## 2019-11-06 RX ORDER — NITROFURANTOIN MACROCRYSTALS 50 MG/1
50 CAPSULE ORAL
Qty: 30 CAP | Refills: 1 | Status: SHIPPED | OUTPATIENT
Start: 2019-11-06

## 2019-11-06 RX ADMIN — OXYBUTYNIN CHLORIDE 5 MG: 5 TABLET ORAL at 08:56

## 2019-11-06 RX ADMIN — FAMOTIDINE 20 MG: 20 TABLET, FILM COATED ORAL at 08:56

## 2019-11-06 RX ADMIN — Medication 10 ML: at 05:06

## 2019-11-06 RX ADMIN — DOCUSATE SODIUM 100 MG: 100 CAPSULE, LIQUID FILLED ORAL at 08:56

## 2019-11-06 RX ADMIN — CEFEPIME 2 G: 2 INJECTION, POWDER, FOR SOLUTION INTRAVENOUS at 05:06

## 2019-11-06 RX ADMIN — SERTRALINE HYDROCHLORIDE 50 MG: 50 TABLET ORAL at 08:56

## 2019-11-06 RX ADMIN — ASPIRIN 81 MG: 81 TABLET, COATED ORAL at 08:56

## 2019-11-06 RX ADMIN — MAGNESIUM GLUCONATE 500 MG ORAL TABLET 400 MG: 500 TABLET ORAL at 08:56

## 2019-11-06 RX ADMIN — POTASSIUM CHLORIDE 20 MEQ: 20 TABLET, EXTENDED RELEASE ORAL at 08:56

## 2019-11-06 RX ADMIN — SENNOSIDES 17.2 MG: 8.6 TABLET, FILM COATED ORAL at 08:57

## 2019-11-06 RX ADMIN — VITAMIN D, TAB 1000IU (100/BT) 2 TABLET: 25 TAB at 08:57

## 2019-11-06 RX ADMIN — MULTIPLE VITAMINS W/ MINERALS TAB 1 TABLET: TAB at 08:56

## 2019-11-06 RX ADMIN — GUAIFENESIN 1200 MG: 600 TABLET ORAL at 08:56

## 2019-11-06 RX ADMIN — Medication 1 AMPULE: at 08:56

## 2019-11-06 RX ADMIN — POLYETHYLENE GLYCOL 3350 17 G: 17 POWDER, FOR SOLUTION ORAL at 08:57

## 2019-11-06 NOTE — WOUND CARE
Right ischial area with 7cm fold of scarred nearly epithelized skin, there is a small tract medial towards 3 o'clock with open area of 0.3x0.4x? Unable to probe with cotton swab as opening is to small. In sacral and ischial wounds when the original wound is a stage IV, as it heals if osteomylitis is still present a tunnel or tract will be formed and the only way to fully heal is to treat the osteomyelitis if present, this may require bone biopsy (to identify organism and confirm infection) or long term broad spectrum antibiotics consider ID to follow long term. The current open area only needs kept clean and protected, if there is no underlying infection it likely will fully heal over in a couple of weeks. Will use zinc barrier cream bid and prn. Continue frequent turning and offloading. Continue protective foam dressing to other bony prominences. Noted patient may discharge to rehab today, consider low air loss mattress.

## 2019-11-06 NOTE — PROGRESS NOTES
Hourly rounds completed this shift. Patient denies nausea, vomiting, & pain. Patient constipated & given MOM enema with great results. All needs met at this time. Will continue to monitor & give report to oncoming RN.
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
## 2019-11-06 NOTE — PROGRESS NOTES
11/05/19 2152   Output (mL)   Last Bowel Movement Date 11/05/19   Unmeasurable Output   Stool Occurrence(s) 1   Enema   Enema Type Milk and Molasses   Stool Assessment   Stool Color Brown   Stool Appearance Formed   Stool Amount Large   Stool Source/Status Rectum

## 2019-11-06 NOTE — PROGRESS NOTES
Pt to discharge this day to [facility] room 305 B and the report line is [phone]. CM did arrange Iván Iniguez for  at 3:30pm. Pt will notify family.

## 2019-11-06 NOTE — CDMP QUERY
A cause and effect relationship may not be assumed and must be documented by a provider. Please clarify the relationship, if any, between the patient's current uti and the patient's chronic suprapubic catheter. Are the conditions: 
» Due to or associated with each other » Unrelated to each other » Clinically unable to determine » Unknown Thank you. Neal Mcnair RN, BSN, CDS Clinical Documentation Management Program 
[facility] AT [address] 
[phone] [email or web address]

## 2019-11-06 NOTE — PROGRESS NOTES
Alleyvn x2 applied to sacrum and left hip area. Small open area that looks to be skin grafted below rectum. Barrier cream applied to sacrum and scrotum as redness is noted. Patient turned every 2 hours. Prevalon boots on bilateral feet.

## 2019-11-06 NOTE — PROGRESS NOTES
TRANSFER - OUT REPORT:    Verbal report given to Azul Rizo LPN(name) on [facility]  being transferred to [place](unit) for routine progression of care       Report consisted of patients Situation, Background, Assessment and   Recommendations(SBAR). Information from the following report(s) SBAR was reviewed with the receiving nurse. Opportunity for questions and clarification was provided. Molasses enema given to patient. Very successful. A large amount of formed soft stool out. Patient has alleyvns x2 to sacrum. Barrier cream applied. Nurse aware that patient was given enema prior to leaving hospital.  Incentive spirometer, briefs, cream, pads, and patient supplies sent with patient. Dressing to SP site as it is periodically leaking. Nurse is also aware of this. IV access out and patient awaiting transportation back to [facility]. Hourly rounds performed this shift. All patient needs are met at this time.

## 2019-11-07 ENCOUNTER — PATIENT OUTREACH (OUTPATIENT)
Dept: CASE MANAGEMENT | Age: 79
End: 2019-11-07

## 2019-11-07 NOTE — PROGRESS NOTES
This note will not be viewable in [address] Patient discharged to [facility] where he is a long term care resident on 11/6/19. All care needs are met by staff. [facility] outreach is not indicated at this time.

## 2019-11-12 NOTE — CDMP QUERY
Discharge summary notes, \"Unchanged right sacral decubitus ulcer extending down 
to the bone. \" Wound care note mentioned, \"Right ischial area with 7cm fold of 
scarred nearly epithelized skin, there is a small tract medial towards 3 o'clock 
with open area of 0.3x0.4x? Unable to probe with cotton swab as opening is too 
small. In sacral and ischial wounds when the original wound is a stage IV, as it 
heals if osteomyelitis is still present a tunnel or tract will be formed and the 
only way to fully heal is to treat the osteomyelitis if present, this may 
require bone biopsy (to identify organism and confirm infection) or long term 
broad spectrum antibiotics consider ID to follow long term. The current open 
area only needs kept clean and protected, if there is no underlying infection it 
likely will fully heal over in a couple of weeks. Blanca Gonzalez \" After study, was this patient suspected to have 
a: 
Stage I sacral decubitus ulcer Stage II sacral decubitus ulcer Stage III sacral decubitus ulcer Stage IV sacral decubitus ulcer Unstageable sacral decubitus ulcer Other_________ Unable to determine Thanks, Shira Brian, RN, BSN, CDS Clinical Documentation Management Program for [facility] [address] [place] [address],[address] 
[phone] [email or web address]

## 2019-11-12 NOTE — CDMP QUERY
Please review the following diagnoses and clarify the conflicting documentation if possible. The patient is noted in his past medical history and in the exam portion of the DC summary to be quadriplegic. But on the rest of the notes he is noted to be paraplegic. Could you please note in the DC summary which is the correct diagnosis. Quadriplegia Paraplegia Other Unknown. Thanks, Tia Downing RN, BSN, CDS Clinical Documentation Management Program for [facility] HassanJhon [address] [place] [address] 
[phone] [email or web address]